# Patient Record
Sex: FEMALE | Race: BLACK OR AFRICAN AMERICAN | Employment: OTHER | URBAN - METROPOLITAN AREA
[De-identification: names, ages, dates, MRNs, and addresses within clinical notes are randomized per-mention and may not be internally consistent; named-entity substitution may affect disease eponyms.]

---

## 2021-01-26 ENCOUNTER — APPOINTMENT (OUTPATIENT)
Dept: GENERAL RADIOLOGY | Age: 81
End: 2021-01-26
Attending: EMERGENCY MEDICINE
Payer: MEDICARE

## 2021-01-26 ENCOUNTER — HOSPITAL ENCOUNTER (EMERGENCY)
Age: 81
Discharge: HOME | End: 2021-01-26
Attending: EMERGENCY MEDICINE
Payer: MEDICARE

## 2021-01-26 VITALS
TEMPERATURE: 98.6 F | HEART RATE: 79 BPM | HEIGHT: 59 IN | RESPIRATION RATE: 14 BRPM | OXYGEN SATURATION: 96 % | DIASTOLIC BLOOD PRESSURE: 89 MMHG | SYSTOLIC BLOOD PRESSURE: 167 MMHG | BODY MASS INDEX: 28.22 KG/M2 | WEIGHT: 140 LBS

## 2021-01-26 DIAGNOSIS — J18.1 RIGHT LOWER LOBE CONSOLIDATION (HCC): ICD-10-CM

## 2021-01-26 DIAGNOSIS — E86.0 MILD DEHYDRATION: ICD-10-CM

## 2021-01-26 DIAGNOSIS — U07.1 COVID-19: Primary | ICD-10-CM

## 2021-01-26 LAB
ALBUMIN SERPL-MCNC: 3.4 G/DL (ref 3.4–5)
ALBUMIN/GLOB SERPL: 0.8 {RATIO} (ref 0.8–1.7)
ALP SERPL-CCNC: 74 U/L (ref 45–117)
ALT SERPL-CCNC: 16 U/L (ref 13–56)
ANION GAP SERPL CALC-SCNC: 7 MMOL/L (ref 3–18)
AST SERPL-CCNC: 23 U/L (ref 10–38)
BASOPHILS # BLD: 0 K/UL (ref 0–0.1)
BASOPHILS NFR BLD: 0 % (ref 0–2)
BILIRUB SERPL-MCNC: 0.3 MG/DL (ref 0.2–1)
BNP SERPL-MCNC: 70 PG/ML (ref 0–1800)
BUN SERPL-MCNC: 17 MG/DL (ref 7–18)
BUN/CREAT SERPL: 26 (ref 12–20)
CALCIUM SERPL-MCNC: 9.1 MG/DL (ref 8.5–10.1)
CHLORIDE SERPL-SCNC: 105 MMOL/L (ref 100–111)
CK MB CFR SERPL CALC: NORMAL % (ref 0–4)
CK MB SERPL-MCNC: <1 NG/ML (ref 5–25)
CK SERPL-CCNC: 85 U/L (ref 26–192)
CO2 SERPL-SCNC: 27 MMOL/L (ref 21–32)
CREAT SERPL-MCNC: 0.65 MG/DL (ref 0.6–1.3)
CRP SERPL-MCNC: 1.6 MG/DL (ref 0–0.3)
DIFFERENTIAL METHOD BLD: ABNORMAL
EOSINOPHIL # BLD: 0 K/UL (ref 0–0.4)
EOSINOPHIL NFR BLD: 1 % (ref 0–5)
ERYTHROCYTE [DISTWIDTH] IN BLOOD BY AUTOMATED COUNT: 13.4 % (ref 11.6–14.5)
GLOBULIN SER CALC-MCNC: 4.1 G/DL (ref 2–4)
GLUCOSE SERPL-MCNC: 91 MG/DL (ref 74–99)
HCT VFR BLD AUTO: 36.7 % (ref 35–45)
HGB BLD-MCNC: 11.8 G/DL (ref 12–16)
LYMPHOCYTES # BLD: 0.9 K/UL (ref 0.9–3.6)
LYMPHOCYTES NFR BLD: 21 % (ref 21–52)
MAGNESIUM SERPL-MCNC: 1.8 MG/DL (ref 1.6–2.6)
MCH RBC QN AUTO: 28.4 PG (ref 24–34)
MCHC RBC AUTO-ENTMCNC: 32.2 G/DL (ref 31–37)
MCV RBC AUTO: 88.2 FL (ref 74–97)
MONOCYTES # BLD: 0.6 K/UL (ref 0.05–1.2)
MONOCYTES NFR BLD: 14 % (ref 3–10)
NEUTS SEG # BLD: 2.7 K/UL (ref 1.8–8)
NEUTS SEG NFR BLD: 64 % (ref 40–73)
PLATELET # BLD AUTO: 176 K/UL (ref 135–420)
PMV BLD AUTO: 11.2 FL (ref 9.2–11.8)
POTASSIUM SERPL-SCNC: 3.9 MMOL/L (ref 3.5–5.5)
PROCALCITONIN SERPL-MCNC: <0.05 NG/ML
PROT SERPL-MCNC: 7.5 G/DL (ref 6.4–8.2)
RBC # BLD AUTO: 4.16 M/UL (ref 4.2–5.3)
SODIUM SERPL-SCNC: 139 MMOL/L (ref 136–145)
TROPONIN I SERPL-MCNC: <0.02 NG/ML (ref 0–0.04)
WBC # BLD AUTO: 4.1 K/UL (ref 4.6–13.2)

## 2021-01-26 PROCEDURE — 74011250636 HC RX REV CODE- 250/636: Performed by: EMERGENCY MEDICINE

## 2021-01-26 PROCEDURE — 80053 COMPREHEN METABOLIC PANEL: CPT

## 2021-01-26 PROCEDURE — 96361 HYDRATE IV INFUSION ADD-ON: CPT

## 2021-01-26 PROCEDURE — 71045 X-RAY EXAM CHEST 1 VIEW: CPT

## 2021-01-26 PROCEDURE — 93005 ELECTROCARDIOGRAM TRACING: CPT

## 2021-01-26 PROCEDURE — 96374 THER/PROPH/DIAG INJ IV PUSH: CPT

## 2021-01-26 PROCEDURE — 99284 EMERGENCY DEPT VISIT MOD MDM: CPT

## 2021-01-26 PROCEDURE — 94640 AIRWAY INHALATION TREATMENT: CPT

## 2021-01-26 PROCEDURE — 74011000250 HC RX REV CODE- 250: Performed by: EMERGENCY MEDICINE

## 2021-01-26 PROCEDURE — 82553 CREATINE MB FRACTION: CPT

## 2021-01-26 PROCEDURE — 86140 C-REACTIVE PROTEIN: CPT

## 2021-01-26 PROCEDURE — 99283 EMERGENCY DEPT VISIT LOW MDM: CPT

## 2021-01-26 PROCEDURE — 74011250637 HC RX REV CODE- 250/637: Performed by: EMERGENCY MEDICINE

## 2021-01-26 PROCEDURE — 83735 ASSAY OF MAGNESIUM: CPT

## 2021-01-26 PROCEDURE — 85025 COMPLETE CBC W/AUTO DIFF WBC: CPT

## 2021-01-26 PROCEDURE — 83880 ASSAY OF NATRIURETIC PEPTIDE: CPT

## 2021-01-26 PROCEDURE — 84145 PROCALCITONIN (PCT): CPT

## 2021-01-26 PROCEDURE — 74011250637 HC RX REV CODE- 250/637

## 2021-01-26 RX ORDER — ONDANSETRON 2 MG/ML
4 INJECTION INTRAMUSCULAR; INTRAVENOUS ONCE
Status: COMPLETED | OUTPATIENT
Start: 2021-01-26 | End: 2021-01-26

## 2021-01-26 RX ORDER — ACETAMINOPHEN 325 MG/1
650 TABLET ORAL
Qty: 30 TAB | Refills: 0 | Status: SHIPPED | OUTPATIENT
Start: 2021-01-26 | End: 2021-02-05

## 2021-01-26 RX ORDER — ACETAMINOPHEN 325 MG/1
TABLET ORAL
Status: DISCONTINUED
Start: 2021-01-26 | End: 2021-01-26

## 2021-01-26 RX ORDER — IPRATROPIUM BROMIDE AND ALBUTEROL SULFATE 2.5; .5 MG/3ML; MG/3ML
6 SOLUTION RESPIRATORY (INHALATION)
Status: COMPLETED | OUTPATIENT
Start: 2021-01-26 | End: 2021-01-26

## 2021-01-26 RX ORDER — ACETAMINOPHEN 325 MG/1
975 TABLET ORAL
Status: COMPLETED | OUTPATIENT
Start: 2021-01-26 | End: 2021-01-26

## 2021-01-26 RX ORDER — AMLODIPINE BESYLATE 10 MG/1
10 TABLET ORAL
Status: COMPLETED | OUTPATIENT
Start: 2021-01-26 | End: 2021-01-26

## 2021-01-26 RX ORDER — DOXYCYCLINE HYCLATE 100 MG
100 TABLET ORAL 2 TIMES DAILY
Qty: 14 TAB | Refills: 0 | Status: SHIPPED | OUTPATIENT
Start: 2021-01-26 | End: 2021-02-02

## 2021-01-26 RX ADMIN — SODIUM CHLORIDE 500 ML: 900 INJECTION, SOLUTION INTRAVENOUS at 13:01

## 2021-01-26 RX ADMIN — ONDANSETRON 4 MG: 2 INJECTION INTRAMUSCULAR; INTRAVENOUS at 13:01

## 2021-01-26 RX ADMIN — AMLODIPINE BESYLATE 10 MG: 10 TABLET ORAL at 13:01

## 2021-01-26 RX ADMIN — ACETAMINOPHEN 975 MG: 325 TABLET, FILM COATED ORAL at 11:10

## 2021-01-26 RX ADMIN — IPRATROPIUM BROMIDE AND ALBUTEROL SULFATE 6 ML: .5; 3 SOLUTION RESPIRATORY (INHALATION) at 13:01

## 2021-01-26 RX ADMIN — ACETAMINOPHEN 975 MG: 325 TABLET ORAL at 11:10

## 2021-01-26 NOTE — DISCHARGE INSTRUCTIONS
You should maintain yourself in isolation for at least 14 days of symptom onset. You should take the doxycycline until it is gone. You can take the Tylenol as needed for fever which is a temperature greater than 100.4. You can take it up to every 6 hours. Use caution not to take any other medications that contain acetaminophen or Tylenol. You are being given a pulse oximeter. You place it on your finger and will tell you the oxygen level in your blood. You should return if your number is less than 92% to be reevaluated. Likewise if you develop chest pain, worsening shortness of breath, uncontrolled vomiting or any other symptoms that concern you, you should return to be rechecked. Zaina Vidal

## 2021-01-26 NOTE — ED PROVIDER NOTES
EMERGENCY DEPARTMENT HISTORY AND PHYSICAL EXAM    11:33 AM    Date: 1/26/2021  Patient Name: Tayo Carreon    History of Presenting Illness     Chief Complaint   Patient presents with    Positive For Covid-19    Shortness of Breath       History Provided By: Patient and EMS  Location/Duration/Severity/Modifying factors   Patient is a pleasant 47-year-old female with past medical history significant for diabetes, peripheral artery disease, chronic pain, hypertension presented to the emergency department today with a chief complaint of fever, nausea, diarrhea, cough and dyspnea on exertion. Symptoms onset originally about 5 days ago with a cough. She reports that 3 days ago she developed a fever. Her son evidently is positive for COVID-19. Patient reports she was seen at [facility] urgent care today where she had a rapid Covid test and that ended up being positive and she was referred to our facility. The cough worsens when she ambulates or takes deep breaths. Nothing seems to really make it better so far. She started having the diarrhea as well which she describes as nonbloody. Shortness of breath also worsens with ambulation improves with rest.  She does not have any chest pain. She denied any abdominal pain. She denies any history of venous thrombosis but reports history of peripheral arterial disease. She evidently did not take her antihypertensives this morning. PCP: No primary care provider on file. Past History     Past Medical History:  Past Medical History:   Diagnosis Date    Arthritis     Diabetes ([address]     Hypertension     Pain management        Past Surgical History:  History reviewed. No pertinent surgical history. Family History:  History reviewed. No pertinent family history.     Social History:  Social History     Tobacco Use    Smoking status: Never Smoker    Smokeless tobacco: Never Used   Substance Use Topics    Alcohol use: Not Currently    Drug use: Not Currently       Allergies: Allergies   Allergen Reactions    Iodinated Contrast Media Rash       I reviewed and confirmed the above information with patient and updated as necessary. Review of Systems     Review of Systems   Constitutional: Positive for fatigue and fever. Negative for chills. HENT: Negative for congestion, rhinorrhea, sinus pressure and sneezing. Eyes: Negative for visual disturbance. Respiratory: Positive for cough, shortness of breath and wheezing. Cardiovascular: Negative for chest pain. Gastrointestinal: Positive for diarrhea, nausea and vomiting. Negative for abdominal pain. Genitourinary: Negative for dysuria, frequency and urgency. Musculoskeletal: Positive for myalgias. Negative for back pain and neck pain. Skin: Negative for rash. Neurological: Negative for syncope, numbness and headaches. Physical Exam     Visit Vitals  BP (!) 167/89 (BP 1 Location: Right arm, BP Patient Position: Sitting)   Pulse 79   Temp (!) 101 °F (38.3 °C)   Resp 14   Ht 4' 11\" (1.499 m)   Wt 63.5 kg (140 lb)   SpO2 96%   BMI 28.28 kg/m²       Physical Exam  Vitals signs and nursing note reviewed. Constitutional:       General: She is not in acute distress. Appearance: Normal appearance. She is well-developed and normal weight. She is not toxic-appearing. HENT:      Head: Normocephalic and atraumatic. Right Ear: External ear normal.      Left Ear: External ear normal.      Nose: Nose normal.      Mouth/Throat:      Mouth: Mucous membranes are moist.   Eyes:      Conjunctiva/sclera: Conjunctivae normal.      Pupils: Pupils are equal, round, and reactive to light. Neck:      Musculoskeletal: Normal range of motion and neck supple. Cardiovascular:      Rate and Rhythm: Normal rate and regular rhythm. Pulses: Normal pulses. Heart sounds: Normal heart sounds. No murmur.    Pulmonary:      Effort: Pulmonary effort is normal. No tachypnea, accessory muscle usage or respiratory distress. Breath sounds: Examination of the right-middle field reveals wheezing. Examination of the left-middle field reveals wheezing. Wheezing present. No rhonchi or rales. Abdominal:      General: Abdomen is flat. Tenderness: There is no abdominal tenderness. There is no guarding or rebound. Musculoskeletal: Normal range of motion. General: No swelling or tenderness. Right lower leg: No edema. Left lower leg: No edema. Skin:     General: Skin is warm and dry. Capillary Refill: Capillary refill takes less than 2 seconds. Findings: No rash. Neurological:      General: No focal deficit present. Mental Status: She is alert and oriented to person, place, and time. Cranial Nerves: No cranial nerve deficit. Motor: No weakness. Diagnostic Study Results     Labs -  Recent Results (from the past 24 hour(s))   CBC WITH AUTOMATED DIFF    Collection Time: 01/26/21 10:52 AM   Result Value Ref Range    WBC 4.1 (L) 4.6 - 13.2 K/uL    RBC 4.16 (L) 4.20 - 5.30 M/uL    HGB 11.8 (L) 12.0 - 16.0 g/dL    HCT 36.7 35.0 - 45.0 %    MCV 88.2 74.0 - 97.0 FL    MCH 28.4 24.0 - 34.0 PG    MCHC 32.2 31.0 - 37.0 g/dL    RDW 13.4 11.6 - 14.5 %    PLATELET 513 740 - 405 K/uL    MPV 11.2 9.2 - 11.8 FL    NEUTROPHILS 64 40 - 73 %    LYMPHOCYTES 21 21 - 52 %    MONOCYTES 14 (H) 3 - 10 %    EOSINOPHILS 1 0 - 5 %    BASOPHILS 0 0 - 2 %    ABS. NEUTROPHILS 2.7 1.8 - 8.0 K/UL    ABS. LYMPHOCYTES 0.9 0.9 - 3.6 K/UL    ABS. MONOCYTES 0.6 0.05 - 1.2 K/UL    ABS. EOSINOPHILS 0.0 0.0 - 0.4 K/UL    ABS.  BASOPHILS 0.0 0.0 - 0.1 K/UL    DF AUTOMATED     METABOLIC PANEL, COMPREHENSIVE    Collection Time: 01/26/21 10:52 AM   Result Value Ref Range    Sodium 139 136 - 145 mmol/L    Potassium 3.9 3.5 - 5.5 mmol/L    Chloride 105 100 - 111 mmol/L    CO2 27 21 - 32 mmol/L    Anion gap 7 3.0 - 18 mmol/L    Glucose 91 74 - 99 mg/dL    BUN 17 7.0 - 18 MG/DL    Creatinine 0.65 0.6 - 1.3 MG/DL    BUN/Creatinine ratio 26 (H) 12 - 20      GFR est AA >60 >60 ml/min/1.73m2    GFR est non-AA >60 >60 ml/min/1.73m2    Calcium 9.1 8.5 - 10.1 MG/DL    Bilirubin, total 0.3 0.2 - 1.0 MG/DL    ALT (SGPT) 16 13 - 56 U/L    AST (SGOT) 23 10 - 38 U/L    Alk. phosphatase 74 45 - 117 U/L    Protein, total 7.5 6.4 - 8.2 g/dL    Albumin 3.4 3.4 - 5.0 g/dL    Globulin 4.1 (H) 2.0 - 4.0 g/dL    A-G Ratio 0.8 0.8 - 1.7     MAGNESIUM    Collection Time: 01/26/21 10:52 AM   Result Value Ref Range    Magnesium 1.8 1.6 - 2.6 mg/dL   NT-PRO BNP    Collection Time: 01/26/21 10:52 AM   Result Value Ref Range    NT pro-BNP 70 0 - 1,800 PG/ML   CARDIAC PANEL,(CK, CKMB & TROPONIN)    Collection Time: 01/26/21 10:52 AM   Result Value Ref Range    CK - MB <1.0 <3.6 ng/ml    CK-MB Index  0.0 - 4.0 %     CALCULATION NOT PERFORMED WHEN RESULT IS BELOW LINEAR LIMIT    CK 85 26 - 192 U/L    Troponin-I, QT <0.02 0.0 - 0.045 NG/ML   EKG, 12 LEAD, INITIAL    Collection Time: 01/26/21 10:59 AM   Result Value Ref Range    Ventricular Rate 85 BPM    Atrial Rate 85 BPM    P-R Interval 168 ms    QRS Duration 76 ms    Q-T Interval 358 ms    QTC Calculation (Bezet) 426 ms    Calculated P Axis 78 degrees    Calculated R Axis 73 degrees    Calculated T Axis 50 degrees    Diagnosis       Sinus rhythm with occasional premature ventricular complexes  Septal infarct , age undetermined  Abnormal ECG  No previous ECGs available           Radiologic Studies -   XR CHEST PORT   Final Result      No definite acute findings. Questionable patchy infiltrate right mid and lower   lung field. Medical Decision Making   I am the first provider for this patient. I reviewed the vital signs, available nursing notes, past medical history, past surgical history, family history and social history. Vital Signs-Reviewed the patient's vital signs. EKG: Interpretation of January 26, 2021, 1059. Sinus rhythm, rate of 85 bpm, normal axis and intervals. Occasional PVCs are present. No ST segment elevation or depression. T wave inversion questionable V3 but artifact obscures this. No pathologic Q waves. Good R wave progression. Overall sinus rhythm, occasional PVCs otherwise no changes. Records Reviewed: Nursing Notes, Old Medical Records, Previous Radiology Studies and Previous Laboratory Studies (Time of Review: 11:33 AM)    ED Course: Progress Notes, Reevaluation, and Consults:  ED Course as of Jan 26 1333   Tue Jan 26, 2021   1255 Contacted the pharmacy, we do have doses of both Regeneron and BAM.  I discussed the risks and benefits potentially with the patient including risk of anaphylaxis or allergic reaction with potential although unproven benefit of improving her duration of illness given her high risk status. Discussed that it may potentially benefit her although the patient may be potential risks of allergic reaction as discussed above. Discussed that it is an experimental treatment with EUA from the FDA but not FDA approval.  Patient indicates that she is not interested in this. She tells me she has zinc at home which she prefers. I Sonia place her on doxycycline given infiltrate on her x-ray. I will give her pulse ox to use at home as well. [MUMTAZ]      ED Course User Index  [MUMTAZ] Thang Borges DO         Provider Notes (Medical Decision Making):   MDM  Number of Diagnoses or Management Options  COVID-19  Mild dehydration  Right lower lobe consolidation [facility])  Diagnosis management comments: Patient is a pleasant 58-year-old female presenting with chief complaint of fever, cough, malaise. Symptoms onset originally 5 days ago with a cough. She developed a fever a couple of days later about 3 days from her presentation. She denies any chest pain but does feel somewhat dyspneic when she ambulates.   She was at urgent care today and tested positive for Covid on the rapid test.  On exam patient is well-appearing ambulates independently to the bathroom. Somewhat dyspneic when she returns. Oxygen saturations maintaining at 96 or 97%. She does have wheezes present somewhat treated with a DuoNeb. Suspect likely sequela from COVID-19 infection given her positive test, will check for electrolyte imbalance, any sign of myocardial ischemia, will check for severity of illness. Lower suspicion for PE given lack of tachycardia or significant hypoxia. Certainly if this changes we will definitely pursue that. Given her high risk status and get a consult the pharmacy and see if they are able to provide any EUA treatments, such as Regeneron or BAM.  Obtain a chest x-ray to look for pneumonia. Case was discussed with pharmacy. See ED course above, the patient ultimately declines these EUA treatments. Results were reviewed. Cardiac biomarkers are negative. Electrolytes are normal.  She is mildly leukopenic which is consistent with known diagnosis of COVID-19. Her x-ray shows maybe some right-sided development opacities I will plan to treat with doxycycline. Discussed with the patient that at times she can get worse during the course of illness and sometimes will get better. Will be seen today is a snapshot of her overall clinical picture. Her oxygen levels are stable on another room with her 97-98 with talking. The patient is not in any respiratory distress. Not hypoxic. She ultimately declines a specialized treatment. She wants to try zinc at home which her son evidently was given when he left the hospital.  She already has some of this at home. I am to give her an ambulatory pulse ox to take home so she can keep an eye on this. I advised her to return if it dips below 92 consistently. Likewise if she feels worse or has any worsening in her symptoms she needs to return at once.     At this time, patient is stable and appropriate for discharge home.  Patient demonstrates understanding of current diagnoses and is in agreement with the treatment plan. Keke Uriarte are advised that while the likelihood of serious underlying condition is low at this point given the evaluation performed today, we cannot fully rule it out. Keke Uriarte are advised to immediately return with any new symptoms or worsening of current condition.  All questions have been answered. Paxton Plummre is given educational material regarding their diagnoses, including danger symptoms and when to return to the ED. This note was dictated utilizing Dragon voice recognition software. Unfortunately this leads to occasional typographical errors. I apologize in advance if the situation occurs. If questions occur please do not hesitate to contact me directly. Kerri Strickland DO            Procedures    Critical Care Time: 0    Diagnosis     Clinical Impression:   1. COVID-19    2. Right lower lobe consolidation ([address]    3. Mild dehydration        Disposition: Discharge    Follow-up Information     Follow up With Specialties Details Why Contact Info    Your Primary Care Physician  In 1 day Call your rheumatologist and PCP tomorrow for follow-up instructions. [facility] EMERGENCY DEPT Emergency Medicine  If symptoms worsen, As needed [address] [place] [address]  [phone]           Patient's Medications   Start Taking    ACETAMINOPHEN (TYLENOL) 325 MG TABLET    Take 2 Tabs by mouth every six (6) hours as needed for Pain or Fever for up to 10 days. DOXYCYCLINE (VIBRA-TABS) 100 MG TABLET    Take 1 Tab by mouth two (2) times a day for 7 days. Continue Taking    No medications on file   These Medications have changed    No medications on file   Stop Taking    No medications on file       Kerri Strickland DO   Emergency Medicine   January 26, 2021, 11:33 AM     This note is dictated utilizing Dragon voice recognition software. Unfortunately this leads to occasional typographical errors using the voice recognition. I apologize in advance if the situation occurs.  If questions occur please do not hesitate to contact me directly.     Flori Done, DO

## 2021-01-26 NOTE — PROGRESS NOTES
Pharmacy Services -  Monoclonal Antibody Treatment evaluation     Completed chart review for Laura North. Patient meets inclusion criteria for use and zero exclusion criteria for bamlanivimab OR casirivimab-imdevimab per FDA EUA. Provider spoke with patient regarding risks and benefits of both monoclonal antibody treatments available. Patient declined treatment at this time. Pharmacy consult will be discontinued. Please contact pharmacy with any questions. Thank you. Bethany Cantu. Kd SAINZ, [address] Clinical Pharmacy Coordinator  [phone]

## 2021-01-27 ENCOUNTER — PATIENT OUTREACH (OUTPATIENT)
Dept: CASE MANAGEMENT | Age: 81
End: 2021-01-27

## 2021-01-27 LAB
ATRIAL RATE: 85 BPM
CALCULATED P AXIS, ECG09: 78 DEGREES
CALCULATED R AXIS, ECG10: 73 DEGREES
CALCULATED T AXIS, ECG11: 50 DEGREES
DIAGNOSIS, 93000: NORMAL
P-R INTERVAL, ECG05: 168 MS
Q-T INTERVAL, ECG07: 358 MS
QRS DURATION, ECG06: 76 MS
QTC CALCULATION (BEZET), ECG08: 426 MS
VENTRICULAR RATE, ECG03: 85 BPM

## 2021-01-27 NOTE — PROGRESS NOTES
Date/Time:  1/27/2021 9:48 AM   Call within 2 business days of discharge: Yes   Attempted to reach patient by telephone. Left HIPPA compliant message requesting a return call. Will attempt to reach patient again.

## 2021-01-28 ENCOUNTER — PATIENT OUTREACH (OUTPATIENT)
Dept: CASE MANAGEMENT | Age: 81
End: 2021-01-28

## 2021-01-28 RX ORDER — GARLIC 1000 MG
1000 CAPSULE ORAL DAILY
COMMUNITY
End: 2021-09-20

## 2021-01-28 RX ORDER — LEFLUNOMIDE 10 MG/1
10 TABLET ORAL DAILY
COMMUNITY
Start: 2020-08-14

## 2021-01-28 RX ORDER — AMLODIPINE BESYLATE 10 MG/1
10 TABLET ORAL DAILY
COMMUNITY
End: 2021-09-07

## 2021-01-28 RX ORDER — ASPIRIN 81 MG/1
81 TABLET ORAL DAILY
COMMUNITY
End: 2021-09-20

## 2021-01-28 RX ORDER — METFORMIN HYDROCHLORIDE 1000 MG/1
500 TABLET, FILM COATED, EXTENDED RELEASE ORAL 2 TIMES DAILY
COMMUNITY
End: 2021-09-20

## 2021-01-28 RX ORDER — ISOSORBIDE MONONITRATE 60 MG/1
60 TABLET, EXTENDED RELEASE ORAL
COMMUNITY
Start: 2020-12-08

## 2021-01-28 RX ORDER — CHLORPHENIRAMINE MALEATE 4 MG
1200 TABLET ORAL DAILY
COMMUNITY
End: 2021-09-20

## 2021-01-28 NOTE — PROGRESS NOTES
Patient contacted regarding COVID-19 diagnosis. Patient does not have pulse OX device - was given a prescription for a pulse OX and stated she would get the device today    Care Transition Nurse/ Ambulatory Care Manager contacted the patient by telephone to perform post discharge assessment. Verified name and  with patient as identifiers. Provided introduction to self, and explanation of the CTN/ACM role, and reason for call due to risk factors for infection and/or exposure to COVID-19. Symptoms reviewed with patient who verbalized the following symptoms: cough, no new symptoms and no worsening symptoms. Due to no new or worsening symptoms encounter was not routed to provider for escalation. Patient has following risk factors of: diabetes. CTN/ACM reviewed discharge instructions, medical action plan and red flags such as increased shortness of breath, increasing fever and signs of decompensation with patient who verbalized understanding. Discussed exposure protocols and quarantine with CDC Guidelines What to do if you are sick with coronavirus disease 2019.  Patient was given an opportunity for questions and concerns. The patient agrees to contact the [facility] exposure line [phone], [facility]  ([phone]) and PCP office for questions related to their healthcare. CTN/ACM provided contact information for future needs. Reviewed and educated patient on any new and changed medications related to discharge diagnosis. Advance Care Planning:   Does patient have an Advance Directive:  not on file; education provided     Medication(s):   Patient/family/caregiver given information for [employer] Loop and agrees to enroll no - Patient does not have e-mail or access to a computer - does not want LOOP    Medication reconciliation done at this encounter with patient /family.  Shows understanding of medication therapy    Patient's preferred e-mail:  [employer]  Patient's preferred phone number:     Based on Loop alert triggers, patient will be contacted by nurse care manager for worsening symptoms. Plan for follow-up call in 3-5 days based on severity of symptoms and risk factors.

## 2021-09-01 ENCOUNTER — TRANSCRIBE ORDER (OUTPATIENT)
Dept: REGISTRATION | Age: 81
End: 2021-09-01

## 2021-09-01 ENCOUNTER — HOSPITAL ENCOUNTER (OUTPATIENT)
Dept: PREADMISSION TESTING | Age: 81
Discharge: HOME | End: 2021-09-01
Payer: MEDICARE

## 2021-09-01 DIAGNOSIS — M16.11 PRIMARY OSTEOARTHRITIS OF RIGHT HIP: Primary | ICD-10-CM

## 2021-09-01 DIAGNOSIS — M16.11 PRIMARY OSTEOARTHRITIS OF RIGHT HIP: ICD-10-CM

## 2021-09-01 LAB
ALBUMIN SERPL-MCNC: 3.7 G/DL (ref 3.4–5)
ALBUMIN/GLOB SERPL: 1 {RATIO} (ref 0.8–1.7)
ALP SERPL-CCNC: 83 U/L (ref 45–117)
ALT SERPL-CCNC: 15 U/L (ref 13–56)
ANION GAP SERPL CALC-SCNC: 5 MMOL/L (ref 3–18)
APPEARANCE UR: CLEAR
APTT PPP: 25.7 SEC (ref 23–36.4)
AST SERPL-CCNC: 17 U/L (ref 10–38)
ATRIAL RATE: 82 BPM
BACTERIA URNS QL MICRO: ABNORMAL /HPF
BASOPHILS # BLD: 0.1 K/UL (ref 0–0.1)
BASOPHILS NFR BLD: 1 % (ref 0–2)
BILIRUB SERPL-MCNC: 0.2 MG/DL (ref 0.2–1)
BILIRUB UR QL: NEGATIVE
BUN SERPL-MCNC: 18 MG/DL (ref 7–18)
BUN/CREAT SERPL: 28 (ref 12–20)
CALCIUM SERPL-MCNC: 10 MG/DL (ref 8.5–10.1)
CALCULATED P AXIS, ECG09: 88 DEGREES
CALCULATED R AXIS, ECG10: 91 DEGREES
CALCULATED T AXIS, ECG11: 94 DEGREES
CHLORIDE SERPL-SCNC: 105 MMOL/L (ref 100–111)
CO2 SERPL-SCNC: 31 MMOL/L (ref 21–32)
COLOR UR: YELLOW
CREAT SERPL-MCNC: 0.64 MG/DL (ref 0.6–1.3)
DIAGNOSIS, 93000: NORMAL
DIFFERENTIAL METHOD BLD: ABNORMAL
EOSINOPHIL # BLD: 0.2 K/UL (ref 0–0.4)
EOSINOPHIL NFR BLD: 3 % (ref 0–5)
EPITH CASTS URNS QL MICRO: ABNORMAL /LPF (ref 0–5)
ERYTHROCYTE [DISTWIDTH] IN BLOOD BY AUTOMATED COUNT: 13.1 % (ref 11.6–14.5)
ERYTHROCYTE [SEDIMENTATION RATE] IN BLOOD: 38 MM/HR (ref 0–30)
GLOBULIN SER CALC-MCNC: 3.7 G/DL (ref 2–4)
GLUCOSE SERPL-MCNC: 86 MG/DL (ref 74–99)
GLUCOSE UR STRIP.AUTO-MCNC: NEGATIVE MG/DL
HBA1C MFR BLD: 6.3 % (ref 4.2–5.6)
HCT VFR BLD AUTO: 35.1 % (ref 35–45)
HGB BLD-MCNC: 11.6 G/DL (ref 12–16)
HGB UR QL STRIP: NEGATIVE
INR PPP: 1 (ref 0.8–1.2)
KETONES UR QL STRIP.AUTO: NEGATIVE MG/DL
LEUKOCYTE ESTERASE UR QL STRIP.AUTO: ABNORMAL
LYMPHOCYTES # BLD: 1.4 K/UL (ref 0.9–3.6)
LYMPHOCYTES NFR BLD: 18 % (ref 21–52)
MCH RBC QN AUTO: 28.9 PG (ref 24–34)
MCHC RBC AUTO-ENTMCNC: 33 G/DL (ref 31–37)
MCV RBC AUTO: 87.5 FL (ref 78–100)
MONOCYTES # BLD: 0.6 K/UL (ref 0.05–1.2)
MONOCYTES NFR BLD: 8 % (ref 3–10)
NEUTS SEG # BLD: 5.4 K/UL (ref 1.8–8)
NEUTS SEG NFR BLD: 71 % (ref 40–73)
NITRITE UR QL STRIP.AUTO: NEGATIVE
P-R INTERVAL, ECG05: 184 MS
PH UR STRIP: 6 [PH] (ref 5–8)
PLATELET # BLD AUTO: 225 K/UL (ref 135–420)
PMV BLD AUTO: 10.2 FL (ref 9.2–11.8)
POTASSIUM SERPL-SCNC: 3.9 MMOL/L (ref 3.5–5.5)
PROT SERPL-MCNC: 7.4 G/DL (ref 6.4–8.2)
PROT UR STRIP-MCNC: NEGATIVE MG/DL
PROTHROMBIN TIME: 12.5 SEC (ref 11.5–15.2)
Q-T INTERVAL, ECG07: 352 MS
QRS DURATION, ECG06: 76 MS
QTC CALCULATION (BEZET), ECG08: 411 MS
RBC # BLD AUTO: 4.01 M/UL (ref 4.2–5.3)
RBC #/AREA URNS HPF: ABNORMAL /HPF (ref 0–5)
SODIUM SERPL-SCNC: 141 MMOL/L (ref 136–145)
SP GR UR REFRACTOMETRY: 1.01 (ref 1–1.03)
UROBILINOGEN UR QL STRIP.AUTO: 0.2 EU/DL (ref 0.2–1)
VENTRICULAR RATE, ECG03: 82 BPM
WBC # BLD AUTO: 7.7 K/UL (ref 4.6–13.2)
WBC URNS QL MICRO: ABNORMAL /HPF (ref 0–5)

## 2021-09-01 PROCEDURE — 36415 COLL VENOUS BLD VENIPUNCTURE: CPT

## 2021-09-01 PROCEDURE — 85025 COMPLETE CBC W/AUTO DIFF WBC: CPT

## 2021-09-01 PROCEDURE — 83036 HEMOGLOBIN GLYCOSYLATED A1C: CPT

## 2021-09-01 PROCEDURE — 85652 RBC SED RATE AUTOMATED: CPT

## 2021-09-01 PROCEDURE — 81001 URINALYSIS AUTO W/SCOPE: CPT

## 2021-09-01 PROCEDURE — 85730 THROMBOPLASTIN TIME PARTIAL: CPT

## 2021-09-01 PROCEDURE — 93005 ELECTROCARDIOGRAM TRACING: CPT

## 2021-09-01 PROCEDURE — 85610 PROTHROMBIN TIME: CPT

## 2021-09-01 PROCEDURE — 80053 COMPREHEN METABOLIC PANEL: CPT

## 2021-09-01 PROCEDURE — 87086 URINE CULTURE/COLONY COUNT: CPT

## 2021-09-02 ENCOUNTER — ANESTHESIA EVENT (OUTPATIENT)
Dept: SURGERY | Age: 81
End: 2021-09-02
Payer: MEDICARE

## 2021-09-03 LAB
BACTERIA SPEC CULT: NORMAL
SERVICE CMNT-IMP: NORMAL
SERVICE CMNT-IMP: NORMAL

## 2021-09-07 ENCOUNTER — HOSPITAL ENCOUNTER (OUTPATIENT)
Dept: PREADMISSION TESTING | Age: 81
Discharge: HOME | End: 2021-09-07

## 2021-09-07 VITALS — WEIGHT: 134 LBS | BODY MASS INDEX: 27.01 KG/M2 | HEIGHT: 59 IN

## 2021-09-07 RX ORDER — CEFAZOLIN SODIUM/WATER 2 G/20 ML
2 SYRINGE (ML) INTRAVENOUS ONCE
Status: CANCELLED | OUTPATIENT
Start: 2021-09-07 | End: 2021-09-07

## 2021-09-07 RX ORDER — FUROSEMIDE 20 MG/1
20 TABLET ORAL DAILY
COMMUNITY
End: 2021-09-20

## 2021-09-07 RX ORDER — MULTIVITAMIN
TABLET ORAL
COMMUNITY
End: 2021-09-20

## 2021-09-07 RX ORDER — BENAZEPRIL HYDROCHLORIDE 40 MG/1
40 TABLET ORAL DAILY
COMMUNITY

## 2021-09-07 RX ORDER — DILTIAZEM HYDROCHLORIDE 120 MG/1
240 CAPSULE, EXTENDED RELEASE ORAL DAILY
COMMUNITY
Start: 2021-06-01

## 2021-09-07 RX ORDER — SODIUM CHLORIDE, SODIUM LACTATE, POTASSIUM CHLORIDE, CALCIUM CHLORIDE 600; 310; 30; 20 MG/100ML; MG/100ML; MG/100ML; MG/100ML
125 INJECTION, SOLUTION INTRAVENOUS CONTINUOUS
Status: CANCELLED | OUTPATIENT
Start: 2021-09-07

## 2021-09-07 RX ORDER — DICLOFENAC SODIUM 10 MG/G
GEL TOPICAL 4 TIMES DAILY
COMMUNITY
End: 2021-09-20

## 2021-09-07 RX ORDER — HYDROCODONE BITARTRATE AND ACETAMINOPHEN 5; 325 MG/1; MG/1
TABLET ORAL
COMMUNITY
End: 2021-09-20

## 2021-09-07 NOTE — PERIOP NOTES
PAT - SURGICAL PRE-ADMISSION INSTRUCTIONS    NAME:  Hill Ruvalcaba                                                          TODAY'S DATE:  9/7/2021    SURGERY DATE:  9/20/2021                                  SURGERY ARRIVAL TIME:   TBA    1. Do NOT eat or drink anything, including candy or gum, after MIDNIGHT on 9-20 , unless you have specific instructions from your Surgeon or Anesthesia Provider to do so. 2. No smoking 24 hours before surgery. 3. No alcohol 24 hours prior to the day of surgery. 4. No recreational drugs for one week prior to the day of surgery. 5. Leave all valuables, including money/purse, at home. 6. Remove all jewelry, nail polish, makeup (including mascara); no lotions, powders, deodorant, or perfume/cologne/after shave. 7. Glasses/Contact lenses and Dentures may be worn to the hospital.  They will be removed prior to surgery. 8. Call your doctor if symptoms of a cold or illness develop within 24 ours prior to surgery. 9. AN ADULT MUST DRIVE YOU HOME AFTER OUTPATIENT SURGERY. 10. If you are having an OUTPATIENT procedure, please make arrangements for a responsible adult to be with you for 24 hours after your surgery. 11. If you are admitted to the hospital, you will be assigned to a bed after surgery is complete. Normally a family member will not be able to see you until you are in your assigned bed. 12. Visitation Restrictions Explained. Special Instructions:  Covid Test 9-16, Quarantine requirements discussed  Bring CPAP. Patient Prep:    use CHG solution     No DNR         These surgical instructions were reviewed with the patient during the PAT phone call      A lot of instructions discussed. Reviewed medication instructions for long period of time.  Patient has [facility] phone number to call back with any questions before [facility]

## 2021-09-15 ENCOUNTER — HOSPITAL ENCOUNTER (OUTPATIENT)
Dept: PREADMISSION TESTING | Age: 81
Discharge: HOME | End: 2021-09-15
Payer: MEDICARE

## 2021-09-15 PROCEDURE — U0003 INFECTIOUS AGENT DETECTION BY NUCLEIC ACID (DNA OR RNA); SEVERE ACUTE RESPIRATORY SYNDROME CORONAVIRUS 2 (SARS-COV-2) (CORONAVIRUS DISEASE [COVID-19]), AMPLIFIED PROBE TECHNIQUE, MAKING USE OF HIGH THROUGHPUT TECHNOLOGIES AS DESCRIBED BY CMS-2020-01-R: HCPCS

## 2021-09-16 PROBLEM — M16.11 PRIMARY LOCALIZED OSTEOARTHRITIS OF RIGHT HIP: Chronic | Status: ACTIVE | Noted: 2021-09-16

## 2021-09-16 RX ORDER — DILTIAZEM HYDROCHLORIDE 120 MG/1
120 CAPSULE, EXTENDED RELEASE ORAL DAILY
Status: CANCELLED | OUTPATIENT
Start: 2021-09-16

## 2021-09-16 RX ORDER — ISOSORBIDE MONONITRATE 60 MG/1
60 TABLET, EXTENDED RELEASE ORAL
Status: CANCELLED | OUTPATIENT
Start: 2021-09-16

## 2021-09-16 NOTE — DISCHARGE INSTRUCTIONS
[address] [place] [address] Sports Medicine   Patient Discharge Instructions    Nikita Padilla / [id number] : 1940    Admitted (Not on file) Discharged: 2021     IF YOU HAVE ANY PROBLEMS ONCE YOU ARE  [facility]:   Main office number: [phone]    Your follow up appointment to see either Dr. Juany Doll PA-C, or [facility] CAROLE as scheduled in 2 weeks. If you are unsure of your appointment date call the office at [phone]. Medication Instructions     · Resume your home medictions as directed, you may have directed not to resume supplements until after your follow up. · A prescription for pain medication has been given   · It is important that you take the medication exactly as they are prescribed. · Keep your medication in the bottles provided by the pharmacist and keep a list of the medication names, dosages, and times to be taken in your wallet. · Do not take other medications without consulting your doctor. What to do at [address] [place] [address] Ave your prehospital diet. If you have excessive nausea or vomitting call your doctor's office. Be sure to maintain adequate fluid intake. Some pain medications may cause constipation. Remember to drink fluids, stay as active as possible, and eat plenty of fiber-rich foods. Begin In-Home Physical Therapy; 3 times a week to work on gait training, range of motion, strengthening, and weight bearing exercises as tolerable. Continue to use your walker or cane when walking. May progress from the walker to a cane to complete total bearing as tolerable. Patient may shower. Wrap incision with plastic wrap/covering to prevent incision from getting wet. Avoid complete immersion. YOUR DRESSING SHOULD BE CHANGED BY YOUR HOME HEALTH NURSE 5-7 AFTER SURGERY ACCORDING TO THE DATE WRITTEN ON YOUR DRESSING.           When to Call    - Call if you have a temperature greater then 101  - Unable to keep food down  - Are unable to bear any wieght   - Need a pain medication refill     Information obtained by :  I understand that if any problems occur once I am at home I am to contact my physician. I understand and acknowledge receipt of the instructions indicated above.                                                                                                                                            Physician's or R.N.'s Signature                                                                  Date/Time                                                                                                                                              Patient or Representative Signature                                                          Date/Time

## 2021-09-16 NOTE — H&P
[address] [facility] [address] Medicine  History and Physical Exam    Patient: Alexandria Montague MRN: [id number]  SSN: [id number]    YOB: 1940  Age: 78 y.o. Sex: female      Subjective:      Chief Complaint: right hip pain    History of Present Illness:  Patient complains of right hip pain and difficulty ambulating, which has progressed over the past several months. X-rays showed osteoarthritis of the joint. The patient's pain has persisted and progressed despite conservative treatments and therapies. The patient has been previously treated with nsaids. The patient has at this time opted for surgical intervention. Past Medical History:   Diagnosis Date    Arthritis     Diabetes ([facility] [address]     Hypertension     Pain management     Primary localized osteoarthritis of right hip 9/16/2021    Rheumatoid arthritis ([facility] [address]     Sleep apnea     uses c-pap     Past Surgical History:   Procedure Laterality Date    HX CHOLECYSTECTOMY      HX COLONOSCOPY      HX KNEE REPLACEMENT Left     NEUROLOGICAL PROCEDURE UNLISTED  2003    back fusion    VASCULAR SURGERY PROCEDURE UNLIST      stents in both lower extremeties     Social History     Occupational History    Not on file   Tobacco Use    Smoking status: Never Smoker    Smokeless tobacco: Never Used   Vaping Use    Vaping Use: Never used   Substance and Sexual Activity    Alcohol use: Not Currently    Drug use: Not Currently    Sexual activity: Not on file     Prior to Admission medications    Medication Sig Start Date End Date Taking? Authorizing Provider   benazepriL (LOTENSIN) 40 mg tablet Take 40 mg by mouth daily. Provider, Historical   multivit-min/iron/folic/lutein (CENTRUM SILVER WOMEN PO) Take  by mouth. Provider, Historical   cinnamon bark 500 mg cap Take  by mouth. Provider, Historical   diclofenac (VOLTAREN) 1 % gel Apply  to affected area four (4) times daily.     Provider, Historical   dilTIAZem ER (TIAZAC) 120 mg capsule Take 120 mg by mouth daily. 6/1/21   Provider, Historical   furosemide (Lasix) 20 mg tablet Take 20 mg by mouth daily. Provider, Historical   HYDROcodone-acetaminophen (NORCO) 5-325 mg per tablet Take  by mouth. Provider, Historical   omega-3 fatty acids-fish oil 360-1,200 mg cap Take 1,200 mg by mouth daily. Provider, Historical   metFORMIN (GLUMETZA) 1,000 mg TG24 24 hour tablet Take 500 mg by mouth two (2) times a day. Provider, Historical   leflunomide (ARAVA) 10 mg tablet Take 10 mg by mouth daily. Indications: rheumatoid arthritis 8/14/20   Provider, Historical   isosorbide mononitrate ER (IMDUR) 60 mg CR tablet Take 60 mg by mouth every morning. 12/8/20   Provider, Historical   garlic 7,832 mg cap Take 1,000 mg by mouth daily. Provider, Historical   aspirin delayed-release 81 mg tablet Take 81 mg by mouth daily. Provider, Historical       Allergies: Allergies   Allergen Reactions    Iodinated Contrast Media Rash        Review of Systems:  A comprehensive review of systems was negative except for that written in the History of Present Illness. Objective:       Physical Exam:  HEENT: Normocephalic, atraumatic  Lungs:  Clear to auscultation  Heart:   Regular rate and rhythm  Abdomen: Soft  Extremities:  Pain with range of motion of the right hip. Passive flexion  degrees,                       passive internal rotation 0-10 degrees, with pain throughout ROM,                        passive external rotation 10-20 degrees with pain at the arc of motion. Antalgic gait noted. Assessment:      Arthritis of the right hip. Plan:       Proceed with scheduled RIGHT TOTAL HIP ARTHROPLASTY. The various methods of treatment have been discussed with the patient and family. After consideration of risks, benefits, and other options for treatment, the patient has consented to surgical interventions.  Questions were answered and preoperative teaching was done by Dr Remy Pulido.      Signed By: NATALY Sullivan     September 16, 2021

## 2021-09-16 NOTE — NURSE NAVIGATOR
Carmen Ramon watched the joint seminar online and received a preoperative education booklet in anticipation of joint replacement surgery.      Orthopaedic Nurse Navigator

## 2021-09-17 LAB — SARS-COV-2, NAA: NOT DETECTED

## 2021-09-20 ENCOUNTER — HOSPITAL ENCOUNTER (OUTPATIENT)
Age: 81
Discharge: HOME HEALTH | End: 2021-09-20
Attending: ORTHOPAEDIC SURGERY | Admitting: ORTHOPAEDIC SURGERY
Payer: MEDICARE

## 2021-09-20 ENCOUNTER — APPOINTMENT (OUTPATIENT)
Dept: GENERAL RADIOLOGY | Age: 81
End: 2021-09-20
Attending: ORTHOPAEDIC SURGERY
Payer: MEDICARE

## 2021-09-20 ENCOUNTER — APPOINTMENT (OUTPATIENT)
Dept: GENERAL RADIOLOGY | Age: 81
End: 2021-09-20
Attending: PHYSICIAN ASSISTANT
Payer: MEDICARE

## 2021-09-20 ENCOUNTER — HOME HEALTH ADMISSION (OUTPATIENT)
Dept: HOME HEALTH SERVICES | Facility: HOME HEALTH | Age: 81
End: 2021-09-20
Payer: MEDICARE

## 2021-09-20 ENCOUNTER — ANESTHESIA (OUTPATIENT)
Dept: SURGERY | Age: 81
End: 2021-09-20
Payer: MEDICARE

## 2021-09-20 VITALS
HEIGHT: 59 IN | HEART RATE: 89 BPM | SYSTOLIC BLOOD PRESSURE: 140 MMHG | BODY MASS INDEX: 26.97 KG/M2 | OXYGEN SATURATION: 100 % | RESPIRATION RATE: 16 BRPM | TEMPERATURE: 97.3 F | WEIGHT: 133.8 LBS | DIASTOLIC BLOOD PRESSURE: 58 MMHG

## 2021-09-20 DIAGNOSIS — M16.11 PRIMARY LOCALIZED OSTEOARTHRITIS OF RIGHT HIP: Primary | ICD-10-CM

## 2021-09-20 LAB
ABO + RH BLD: NORMAL
BLOOD GROUP ANTIBODIES SERPL: NORMAL
GLUCOSE BLD STRIP.AUTO-MCNC: 115 MG/DL (ref 70–110)
GLUCOSE BLD STRIP.AUTO-MCNC: 177 MG/DL (ref 70–110)
SPECIMEN EXP DATE BLD: NORMAL

## 2021-09-20 PROCEDURE — 74011250636 HC RX REV CODE- 250/636: Performed by: ORTHOPAEDIC SURGERY

## 2021-09-20 PROCEDURE — 76942 ECHO GUIDE FOR BIOPSY: CPT | Performed by: ORTHOPAEDIC SURGERY

## 2021-09-20 PROCEDURE — 74011250636 HC RX REV CODE- 250/636: Performed by: PHYSICIAN ASSISTANT

## 2021-09-20 PROCEDURE — 77030040361 HC SLV COMPR DVT MDII -B: Performed by: ORTHOPAEDIC SURGERY

## 2021-09-20 PROCEDURE — 74011250636 HC RX REV CODE- 250/636: Performed by: ANESTHESIOLOGY

## 2021-09-20 PROCEDURE — 82962 GLUCOSE BLOOD TEST: CPT

## 2021-09-20 PROCEDURE — 74011000250 HC RX REV CODE- 250: Performed by: ORTHOPAEDIC SURGERY

## 2021-09-20 PROCEDURE — 76210000006 HC OR PH I REC 0.5 TO 1 HR: Performed by: ORTHOPAEDIC SURGERY

## 2021-09-20 PROCEDURE — 77030037713 HC CLOSR DEV INCIS ZIP STRY -B: Performed by: ORTHOPAEDIC SURGERY

## 2021-09-20 PROCEDURE — 2709999900 HC NON-CHARGEABLE SUPPLY: Performed by: ORTHOPAEDIC SURGERY

## 2021-09-20 PROCEDURE — 77030038010: Performed by: ORTHOPAEDIC SURGERY

## 2021-09-20 PROCEDURE — 76010000149 HC OR TIME 1 TO 1.5 HR: Performed by: ORTHOPAEDIC SURGERY

## 2021-09-20 PROCEDURE — 97116 GAIT TRAINING THERAPY: CPT

## 2021-09-20 PROCEDURE — 76060000033 HC ANESTHESIA 1 TO 1.5 HR: Performed by: ORTHOPAEDIC SURGERY

## 2021-09-20 PROCEDURE — 74011250636 HC RX REV CODE- 250/636: Performed by: NURSE ANESTHETIST, CERTIFIED REGISTERED

## 2021-09-20 PROCEDURE — 74011250637 HC RX REV CODE- 250/637: Performed by: ORTHOPAEDIC SURGERY

## 2021-09-20 PROCEDURE — 36415 COLL VENOUS BLD VENIPUNCTURE: CPT

## 2021-09-20 PROCEDURE — 73501 X-RAY EXAM HIP UNI 1 VIEW: CPT

## 2021-09-20 PROCEDURE — 74011250637 HC RX REV CODE- 250/637: Performed by: PHYSICIAN ASSISTANT

## 2021-09-20 PROCEDURE — 97161 PT EVAL LOW COMPLEX 20 MIN: CPT

## 2021-09-20 PROCEDURE — 77010033678 HC OXYGEN DAILY

## 2021-09-20 PROCEDURE — 77030012893: Performed by: ORTHOPAEDIC SURGERY

## 2021-09-20 PROCEDURE — 77030013708 HC HNDPC SUC IRR PULS STRY –B: Performed by: ORTHOPAEDIC SURGERY

## 2021-09-20 PROCEDURE — 77030020782 HC GWN BAIR PAWS FLX 3M -B: Performed by: ORTHOPAEDIC SURGERY

## 2021-09-20 PROCEDURE — 74011250636 HC RX REV CODE- 250/636: Performed by: INTERNAL MEDICINE

## 2021-09-20 PROCEDURE — 74011636637 HC RX REV CODE- 636/637: Performed by: ANESTHESIOLOGY

## 2021-09-20 PROCEDURE — 77030041461 HC RETRCTR GRIPPR KUSA -C: Performed by: ORTHOPAEDIC SURGERY

## 2021-09-20 PROCEDURE — 97165 OT EVAL LOW COMPLEX 30 MIN: CPT

## 2021-09-20 PROCEDURE — C1776 JOINT DEVICE (IMPLANTABLE): HCPCS | Performed by: ORTHOPAEDIC SURGERY

## 2021-09-20 PROCEDURE — 77030041075 HC DRSG AG OPTIFRM MDII -B: Performed by: ORTHOPAEDIC SURGERY

## 2021-09-20 PROCEDURE — 86901 BLOOD TYPING SEROLOGIC RH(D): CPT

## 2021-09-20 PROCEDURE — 74011250637 HC RX REV CODE- 250/637: Performed by: ANESTHESIOLOGY

## 2021-09-20 PROCEDURE — 77030011264 HC ELECTRD BLD EXT COVD -A: Performed by: ORTHOPAEDIC SURGERY

## 2021-09-20 PROCEDURE — 77030027138 HC INCENT SPIROMETER -A: Performed by: ORTHOPAEDIC SURGERY

## 2021-09-20 PROCEDURE — 64450 NJX AA&/STRD OTHER PN/BRANCH: CPT | Performed by: ANESTHESIOLOGY

## 2021-09-20 PROCEDURE — 77030031139 HC SUT VCRL2 J&J -A: Performed by: ORTHOPAEDIC SURGERY

## 2021-09-20 PROCEDURE — 74011000250 HC RX REV CODE- 250: Performed by: NURSE ANESTHETIST, CERTIFIED REGISTERED

## 2021-09-20 PROCEDURE — 77030003666 HC NDL SPINAL BD -A: Performed by: ORTHOPAEDIC SURGERY

## 2021-09-20 PROCEDURE — 77030016060 HC NDL NRV BLK TELE -A: Performed by: ANESTHESIOLOGY

## 2021-09-20 PROCEDURE — 77030012508 HC MSK AIRWY LMA AMBU -A: Performed by: ANESTHESIOLOGY

## 2021-09-20 PROCEDURE — 97535 SELF CARE MNGMENT TRAINING: CPT

## 2021-09-20 PROCEDURE — 76210000025 HC REC RM PH II 3 TO 3.5 HR

## 2021-09-20 PROCEDURE — 74011000250 HC RX REV CODE- 250: Performed by: ANESTHESIOLOGY

## 2021-09-20 DEVICE — IMPLANTABLE DEVICE
Type: IMPLANTABLE DEVICE | Site: HIP | Status: FUNCTIONAL
Brand: LOGICAL

## 2021-09-20 DEVICE — IMPLANTABLE DEVICE
Type: IMPLANTABLE DEVICE | Site: HIP | Status: FUNCTIONAL
Brand: LOGICAL G-SERIES

## 2021-09-20 DEVICE — IMPLANTABLE DEVICE
Type: IMPLANTABLE DEVICE | Site: HIP | Status: FUNCTIONAL
Brand: SIGNATURE FEMORAL HEAD

## 2021-09-20 DEVICE — IMPLANTABLE DEVICE
Type: IMPLANTABLE DEVICE | Site: HIP | Status: FUNCTIONAL
Brand: SPARTAN HIP STEM

## 2021-09-20 RX ORDER — SODIUM CHLORIDE 9 MG/ML
125 INJECTION, SOLUTION INTRAVENOUS CONTINUOUS
Status: DISCONTINUED | OUTPATIENT
Start: 2021-09-20 | End: 2021-09-20 | Stop reason: HOSPADM

## 2021-09-20 RX ORDER — CEFADROXIL 500 MG/1
500 CAPSULE ORAL 2 TIMES DAILY
Qty: 10 CAPSULE | Refills: 0 | Status: SHIPPED | OUTPATIENT
Start: 2021-09-20 | End: 2021-09-25

## 2021-09-20 RX ORDER — SODIUM CHLORIDE 0.9 % (FLUSH) 0.9 %
5-40 SYRINGE (ML) INJECTION EVERY 8 HOURS
Status: DISCONTINUED | OUTPATIENT
Start: 2021-09-20 | End: 2021-09-20 | Stop reason: HOSPADM

## 2021-09-20 RX ORDER — DEXMEDETOMIDINE HYDROCHLORIDE 100 UG/ML
INJECTION, SOLUTION INTRAVENOUS
Status: SHIPPED | OUTPATIENT
Start: 2021-09-20 | End: 2021-09-20

## 2021-09-20 RX ORDER — MELOXICAM 7.5 MG/1
7.5 TABLET ORAL 2 TIMES DAILY
Qty: 28 TABLET | Refills: 0 | Status: SHIPPED | OUTPATIENT
Start: 2021-09-20 | End: 2021-10-04

## 2021-09-20 RX ORDER — ACETAMINOPHEN 325 MG/1
650 TABLET ORAL EVERY 6 HOURS
Status: DISCONTINUED | OUTPATIENT
Start: 2021-09-20 | End: 2021-09-20 | Stop reason: HOSPADM

## 2021-09-20 RX ORDER — CELECOXIB 100 MG/1
200 CAPSULE ORAL ONCE
Status: COMPLETED | OUTPATIENT
Start: 2021-09-20 | End: 2021-09-20

## 2021-09-20 RX ORDER — LANOLIN ALCOHOL/MO/W.PET/CERES
1 CREAM (GRAM) TOPICAL 3 TIMES DAILY
Status: DISCONTINUED | OUTPATIENT
Start: 2021-09-20 | End: 2021-09-20 | Stop reason: HOSPADM

## 2021-09-20 RX ORDER — FENTANYL CITRATE 50 UG/ML
INJECTION, SOLUTION INTRAMUSCULAR; INTRAVENOUS
Status: SHIPPED | OUTPATIENT
Start: 2021-09-20 | End: 2021-09-20

## 2021-09-20 RX ORDER — LABETALOL HCL 20 MG/4 ML
SYRINGE (ML) INTRAVENOUS AS NEEDED
Status: DISCONTINUED | OUTPATIENT
Start: 2021-09-20 | End: 2021-09-20 | Stop reason: HOSPADM

## 2021-09-20 RX ORDER — SODIUM CHLORIDE 0.9 % (FLUSH) 0.9 %
5-40 SYRINGE (ML) INJECTION AS NEEDED
Status: DISCONTINUED | OUTPATIENT
Start: 2021-09-20 | End: 2021-09-20 | Stop reason: HOSPADM

## 2021-09-20 RX ORDER — OXYCODONE HYDROCHLORIDE 5 MG/1
5 TABLET ORAL
Qty: 60 TABLET | Refills: 0 | Status: SHIPPED | OUTPATIENT
Start: 2021-09-20 | End: 2021-09-27

## 2021-09-20 RX ORDER — SODIUM CHLORIDE, SODIUM LACTATE, POTASSIUM CHLORIDE, CALCIUM CHLORIDE 600; 310; 30; 20 MG/100ML; MG/100ML; MG/100ML; MG/100ML
125 INJECTION, SOLUTION INTRAVENOUS CONTINUOUS
Status: DISCONTINUED | OUTPATIENT
Start: 2021-09-20 | End: 2021-09-20 | Stop reason: HOSPADM

## 2021-09-20 RX ORDER — SODIUM CHLORIDE 9 MG/ML
300 INJECTION, SOLUTION INTRAVENOUS CONTINUOUS
Status: DISCONTINUED | OUTPATIENT
Start: 2021-09-20 | End: 2021-09-20 | Stop reason: HOSPADM

## 2021-09-20 RX ORDER — PANTOPRAZOLE SODIUM 40 MG/1
40 TABLET, DELAYED RELEASE ORAL DAILY
Status: DISCONTINUED | OUTPATIENT
Start: 2021-09-20 | End: 2021-09-20 | Stop reason: HOSPADM

## 2021-09-20 RX ORDER — ROPIVACAINE HYDROCHLORIDE 5 MG/ML
INJECTION, SOLUTION EPIDURAL; INFILTRATION; PERINEURAL
Status: SHIPPED | OUTPATIENT
Start: 2021-09-20 | End: 2021-09-20

## 2021-09-20 RX ORDER — HYDRALAZINE HYDROCHLORIDE 20 MG/ML
10 INJECTION INTRAMUSCULAR; INTRAVENOUS ONCE
Status: COMPLETED | OUTPATIENT
Start: 2021-09-20 | End: 2021-09-20

## 2021-09-20 RX ORDER — FENTANYL CITRATE 50 UG/ML
INJECTION, SOLUTION INTRAMUSCULAR; INTRAVENOUS AS NEEDED
Status: DISCONTINUED | OUTPATIENT
Start: 2021-09-20 | End: 2021-09-20 | Stop reason: HOSPADM

## 2021-09-20 RX ORDER — ASPIRIN 325 MG
325 TABLET, DELAYED RELEASE (ENTERIC COATED) ORAL 2 TIMES DAILY
Status: DISCONTINUED | OUTPATIENT
Start: 2021-09-20 | End: 2021-09-20 | Stop reason: HOSPADM

## 2021-09-20 RX ORDER — HYDROMORPHONE HYDROCHLORIDE 2 MG/ML
INJECTION, SOLUTION INTRAMUSCULAR; INTRAVENOUS; SUBCUTANEOUS AS NEEDED
Status: DISCONTINUED | OUTPATIENT
Start: 2021-09-20 | End: 2021-09-20 | Stop reason: HOSPADM

## 2021-09-20 RX ORDER — DEXAMETHASONE SODIUM PHOSPHATE 4 MG/ML
4 INJECTION, SOLUTION INTRA-ARTICULAR; INTRALESIONAL; INTRAMUSCULAR; INTRAVENOUS; SOFT TISSUE ONCE
Status: COMPLETED | OUTPATIENT
Start: 2021-09-20 | End: 2021-09-20

## 2021-09-20 RX ORDER — MAGNESIUM SULFATE 100 %
4 CRYSTALS MISCELLANEOUS AS NEEDED
Status: DISCONTINUED | OUTPATIENT
Start: 2021-09-20 | End: 2021-09-20 | Stop reason: HOSPADM

## 2021-09-20 RX ORDER — DOCUSATE SODIUM 100 MG/1
100 CAPSULE, LIQUID FILLED ORAL 2 TIMES DAILY
Status: DISCONTINUED | OUTPATIENT
Start: 2021-09-20 | End: 2021-09-20 | Stop reason: HOSPADM

## 2021-09-20 RX ORDER — DEXAMETHASONE SODIUM PHOSPHATE 10 MG/ML
INJECTION INTRAMUSCULAR; INTRAVENOUS
Status: SHIPPED | OUTPATIENT
Start: 2021-09-20 | End: 2021-09-20

## 2021-09-20 RX ORDER — DIPHENHYDRAMINE HYDROCHLORIDE 50 MG/ML
12.5 INJECTION, SOLUTION INTRAMUSCULAR; INTRAVENOUS
Status: DISCONTINUED | OUTPATIENT
Start: 2021-09-20 | End: 2021-09-20 | Stop reason: HOSPADM

## 2021-09-20 RX ORDER — DEXAMETHASONE SODIUM PHOSPHATE 4 MG/ML
8 INJECTION, SOLUTION INTRA-ARTICULAR; INTRALESIONAL; INTRAMUSCULAR; INTRAVENOUS; SOFT TISSUE ONCE
Status: DISCONTINUED | OUTPATIENT
Start: 2021-09-20 | End: 2021-09-20

## 2021-09-20 RX ORDER — INSULIN LISPRO 100 [IU]/ML
INJECTION, SOLUTION INTRAVENOUS; SUBCUTANEOUS ONCE
Status: COMPLETED | OUTPATIENT
Start: 2021-09-20 | End: 2021-09-20

## 2021-09-20 RX ORDER — OXYCODONE HYDROCHLORIDE 5 MG/1
10 TABLET ORAL
Status: DISCONTINUED | OUTPATIENT
Start: 2021-09-20 | End: 2021-09-20 | Stop reason: HOSPADM

## 2021-09-20 RX ORDER — ONDANSETRON 2 MG/ML
4 INJECTION INTRAMUSCULAR; INTRAVENOUS
Status: DISCONTINUED | OUTPATIENT
Start: 2021-09-20 | End: 2021-09-20 | Stop reason: HOSPADM

## 2021-09-20 RX ORDER — CEFAZOLIN SODIUM/WATER 2 G/20 ML
2 SYRINGE (ML) INTRAVENOUS EVERY 8 HOURS
Status: DISCONTINUED | OUTPATIENT
Start: 2021-09-20 | End: 2021-09-20 | Stop reason: HOSPADM

## 2021-09-20 RX ORDER — TRANEXAMIC ACID 10 MG/ML
1 INJECTION, SOLUTION INTRAVENOUS SEE ADMIN INSTRUCTIONS
Status: DISCONTINUED | OUTPATIENT
Start: 2021-09-20 | End: 2021-09-20 | Stop reason: HOSPADM

## 2021-09-20 RX ORDER — METOCLOPRAMIDE HYDROCHLORIDE 5 MG/ML
10 INJECTION INTRAMUSCULAR; INTRAVENOUS
Status: DISCONTINUED | OUTPATIENT
Start: 2021-09-20 | End: 2021-09-20 | Stop reason: HOSPADM

## 2021-09-20 RX ORDER — PANTOPRAZOLE SODIUM 40 MG/1
40 TABLET, DELAYED RELEASE ORAL DAILY
Status: DISCONTINUED | OUTPATIENT
Start: 2021-09-20 | End: 2021-09-20

## 2021-09-20 RX ORDER — NALOXONE HYDROCHLORIDE 0.4 MG/ML
0.4 INJECTION, SOLUTION INTRAMUSCULAR; INTRAVENOUS; SUBCUTANEOUS AS NEEDED
Status: DISCONTINUED | OUTPATIENT
Start: 2021-09-20 | End: 2021-09-20 | Stop reason: HOSPADM

## 2021-09-20 RX ORDER — ZOLPIDEM TARTRATE 5 MG/1
5-10 TABLET ORAL
Status: DISCONTINUED | OUTPATIENT
Start: 2021-09-20 | End: 2021-09-20 | Stop reason: HOSPADM

## 2021-09-20 RX ORDER — HYDROMORPHONE HYDROCHLORIDE 1 MG/ML
0.5 INJECTION, SOLUTION INTRAMUSCULAR; INTRAVENOUS; SUBCUTANEOUS ONCE
Status: COMPLETED | OUTPATIENT
Start: 2021-09-20 | End: 2021-09-20

## 2021-09-20 RX ORDER — CEFAZOLIN SODIUM/WATER 2 G/20 ML
2 SYRINGE (ML) INTRAVENOUS ONCE
Status: COMPLETED | OUTPATIENT
Start: 2021-09-20 | End: 2021-09-20

## 2021-09-20 RX ORDER — DEXTROSE 50 % IN WATER (D50W) INTRAVENOUS SYRINGE
25-50 AS NEEDED
Status: DISCONTINUED | OUTPATIENT
Start: 2021-09-20 | End: 2021-09-20 | Stop reason: HOSPADM

## 2021-09-20 RX ORDER — OXYCODONE HYDROCHLORIDE 5 MG/1
5 TABLET ORAL
Status: DISCONTINUED | OUTPATIENT
Start: 2021-09-20 | End: 2021-09-20 | Stop reason: HOSPADM

## 2021-09-20 RX ORDER — LIDOCAINE HYDROCHLORIDE 20 MG/ML
INJECTION, SOLUTION EPIDURAL; INFILTRATION; INTRACAUDAL; PERINEURAL AS NEEDED
Status: DISCONTINUED | OUTPATIENT
Start: 2021-09-20 | End: 2021-09-20 | Stop reason: HOSPADM

## 2021-09-20 RX ORDER — PROPOFOL 10 MG/ML
INJECTION, EMULSION INTRAVENOUS AS NEEDED
Status: DISCONTINUED | OUTPATIENT
Start: 2021-09-20 | End: 2021-09-20 | Stop reason: HOSPADM

## 2021-09-20 RX ORDER — ASPIRIN 325 MG
325 TABLET ORAL 2 TIMES DAILY
Qty: 42 TABLET | Refills: 0 | Status: SHIPPED | OUTPATIENT
Start: 2021-09-20 | End: 2021-10-11

## 2021-09-20 RX ORDER — ONDANSETRON 2 MG/ML
INJECTION INTRAMUSCULAR; INTRAVENOUS AS NEEDED
Status: DISCONTINUED | OUTPATIENT
Start: 2021-09-20 | End: 2021-09-20 | Stop reason: HOSPADM

## 2021-09-20 RX ORDER — ACETAMINOPHEN 500 MG
1000 TABLET ORAL ONCE
Status: COMPLETED | OUTPATIENT
Start: 2021-09-20 | End: 2021-09-20

## 2021-09-20 RX ORDER — TRANEXAMIC ACID 650 1/1
1950 TABLET ORAL ONCE
Status: DISCONTINUED | OUTPATIENT
Start: 2021-09-20 | End: 2021-09-20

## 2021-09-20 RX ORDER — KETOROLAC TROMETHAMINE 30 MG/ML
15 INJECTION, SOLUTION INTRAMUSCULAR; INTRAVENOUS EVERY 6 HOURS
Status: DISCONTINUED | OUTPATIENT
Start: 2021-09-20 | End: 2021-09-20 | Stop reason: HOSPADM

## 2021-09-20 RX ADMIN — FENTANYL CITRATE 50 MCG: 50 INJECTION, SOLUTION INTRAMUSCULAR; INTRAVENOUS at 07:39

## 2021-09-20 RX ADMIN — DEXMEDETOMIDINE HYDROCHLORIDE 25 MCG: 100 INJECTION, SOLUTION INTRAVENOUS at 07:24

## 2021-09-20 RX ADMIN — MEPIVACAINE HYDROCHLORIDE 10 ML: 20 INJECTION, SOLUTION EPIDURAL; INFILTRATION at 07:24

## 2021-09-20 RX ADMIN — INSULIN LISPRO 2 UNITS: 100 INJECTION, SOLUTION INTRAVENOUS; SUBCUTANEOUS at 09:03

## 2021-09-20 RX ADMIN — CEFAZOLIN 2 G: 1 INJECTION, POWDER, FOR SOLUTION INTRAVENOUS at 07:33

## 2021-09-20 RX ADMIN — SODIUM CHLORIDE, SODIUM LACTATE, POTASSIUM CHLORIDE, AND CALCIUM CHLORIDE 125 ML/HR: 600; 310; 30; 20 INJECTION, SOLUTION INTRAVENOUS at 06:36

## 2021-09-20 RX ADMIN — HYDRALAZINE HYDROCHLORIDE 10 MG: 20 INJECTION INTRAMUSCULAR; INTRAVENOUS at 09:17

## 2021-09-20 RX ADMIN — LIDOCAINE HYDROCHLORIDE 60 MG: 20 INJECTION, SOLUTION INTRAVENOUS at 07:39

## 2021-09-20 RX ADMIN — SODIUM CHLORIDE, SODIUM LACTATE, POTASSIUM CHLORIDE, AND CALCIUM CHLORIDE 125 ML/HR: 600; 310; 30; 20 INJECTION, SOLUTION INTRAVENOUS at 09:24

## 2021-09-20 RX ADMIN — ACETAMINOPHEN 650 MG: 325 TABLET ORAL at 13:57

## 2021-09-20 RX ADMIN — TRANEXAMIC ACID 1 G: 10 INJECTION, SOLUTION INTRAVENOUS at 07:41

## 2021-09-20 RX ADMIN — LABETALOL 20 MG/4 ML (5 MG/ML) INTRAVENOUS SYRINGE 10 MG: at 07:54

## 2021-09-20 RX ADMIN — ACETAMINOPHEN 1000 MG: 500 TABLET ORAL at 06:40

## 2021-09-20 RX ADMIN — CELECOXIB 200 MG: 100 CAPSULE ORAL at 06:40

## 2021-09-20 RX ADMIN — HYDROMORPHONE HYDROCHLORIDE 1 MG: 2 INJECTION, SOLUTION INTRAMUSCULAR; INTRAVENOUS; SUBCUTANEOUS at 07:39

## 2021-09-20 RX ADMIN — FENTANYL CITRATE 50 MCG: 50 INJECTION, SOLUTION INTRAMUSCULAR; INTRAVENOUS at 07:24

## 2021-09-20 RX ADMIN — SODIUM CHLORIDE, SODIUM LACTATE, POTASSIUM CHLORIDE, AND CALCIUM CHLORIDE 1000 ML: 600; 310; 30; 20 INJECTION, SOLUTION INTRAVENOUS at 06:37

## 2021-09-20 RX ADMIN — ROPIVACAINE HYDROCHLORIDE 20 ML: 5 INJECTION, SOLUTION EPIDURAL; INFILTRATION; PERINEURAL at 07:24

## 2021-09-20 RX ADMIN — HYDROMORPHONE HYDROCHLORIDE 0.5 MG: 1 INJECTION, SOLUTION INTRAMUSCULAR; INTRAVENOUS; SUBCUTANEOUS at 09:22

## 2021-09-20 RX ADMIN — ONDANSETRON HYDROCHLORIDE 4 MG: 2 INJECTION INTRAMUSCULAR; INTRAVENOUS at 07:41

## 2021-09-20 RX ADMIN — DEXAMETHASONE SODIUM PHOSPHATE 4 MG: 10 INJECTION, SOLUTION INTRAMUSCULAR; INTRAVENOUS at 07:24

## 2021-09-20 RX ADMIN — PROPOFOL 150 MG: 10 INJECTION, EMULSION INTRAVENOUS at 07:39

## 2021-09-20 RX ADMIN — DEXAMETHASONE SODIUM PHOSPHATE 4 MG: 4 INJECTION, SOLUTION INTRAMUSCULAR; INTRAVENOUS at 06:40

## 2021-09-20 RX ADMIN — PANTOPRAZOLE SODIUM 40 MG: 40 TABLET, DELAYED RELEASE ORAL at 06:40

## 2021-09-20 NOTE — PROGRESS NOTES
Progression of Symptoms:    [] Pain 3/10   [] Improvement post medication administration   [] No improvement, provider notified   [] Nausea   [] Improvement post medication administration   [] No improvement, provider notified   [] Hypotension/Hypertension   [] Improved post medication administration   [] No improvement, provider notified     Readiness for Discharge:  [x] Vital signs stable  [x] + Voiding  [x] Wound intact, drainage minimal  [x] Tolerating PO intake  [x] Cleared by PT (OT if applicable)  [x] Discharge education completed with patient and family member to specifically include   [x] Recommended stool softener  [x] Proper elevation visual demonstration

## 2021-09-20 NOTE — PROGRESS NOTES
Problem: Mobility Impaired (Adult and Pediatric)  Goal: *Acute Goals and Plan of Care (Insert Text)  Description: PT goals to be met in 1 day:  Pt will be able to perform supine<>sit SBA for transfers at home. Pt will be able to perform sit<>stand SBA for increased ability to transfer at home safely. Pt will be able to participate in gt training >100' w/ RW, WBAT, GB and CGA/SBA for improved ability in home upon d/c. Pt will be educated regarding HEP per MD protocol for optimal AROM/strength outcomes. Note: [x]  Patient has met MD mobilization critieria for d/c home   [x]  Recommend HH with 24 hour adult care   []  Benefit from additional acute PT session to address:      PHYSICAL THERAPY EVALUATION    Patient: Brittany Lynn (24 y.o. female)  Date: 9/20/2021  Primary Diagnosis: Primary localized osteoarthritis of right hip [M16.11]  Procedure(s) (LRB):  RIGHT TOTAL HIP REPLACEMENT ANTERIOR APPROACH WITH C-ARM (Right) Day of Surgery   Precautions:   Fall, WBAT    PLOF: Independent    ASSESSMENT :  Based on the objective data described below, the patient presents with decreased mobility in regards to bed mobility, transfers, gt quality and tolerance, balance, stair negotiation and safety due to R BOGDAN surgery. Decreased AROM of R hip, dec strength of R hip, dec sensation of R hip also impacting pt functional mobility. Pt rating pain on numerical pain scale pre/post and during session 0/10. Pt ed regarding mobility safety, WB, HEP, ice application/use, elevation, environmental safety and home safe techniques. Pt sitting in recliner upon arrial.  Pt able to perform sit<>stand w/ CGA. Safety vc required throughout session to reinforce safety. Pt able to participate in gt training using RW, GB, WBAT and CGA w/ antalgic gt pattern. Pt required significant additional time for gait training. Pt has threshold step into home and discussed strategy for home entry w/ pt understanding and confidence.   Answered questions by pt in regards to PT and mobility. Pt left sitting in recliner w/ all needs within reach and ice pack to R hip. Nurse Jama aware of session and outcomes. Recommend HHPT with responsible adult care at least 24 hours upon hospital d/c. Patient will benefit from skilled intervention to address the above impairments. Patient's rehabilitation potential is considered to be Good  Factors which may influence rehabilitation potential include:   []         None noted  []         Mental ability/status  []         Medical condition  []         Home/family situation and support systems  []         Safety awareness  [x]         Pain tolerance/management  []         Other:      PLAN :  Recommendations and Planned Interventions:   [x]           Bed Mobility Training             []    Neuromuscular Re-Education  [x]           Transfer Training                   []    Orthotic/Prosthetic Training  [x]           Gait Training                          [x]    Modalities  [x]           Therapeutic Exercises           [x]    Edema Management/Control  [x]           Therapeutic Activities            [x]    Family Training/Education  [x]           Patient Education  []           Other (comment):    Frequency/Duration: Patient will be followed by physical therapy 1-2 times per day/4-7 days per week to address goals. Discharge Recommendations: Home Health  Further Equipment Recommendations for Discharge: N/A     SUBJECTIVE:   Patient stated I think I was sleeping.     OBJECTIVE DATA SUMMARY:     Past Medical History:   Diagnosis Date    Arthritis     Diabetes ([facility] [address]     Hypertension     Pain management     Primary localized osteoarthritis of right hip 9/16/2021    Rheumatoid arthritis ([facility] [address]     Sleep apnea     uses c-pap     Past Surgical History:   Procedure Laterality Date    HX CHOLECYSTECTOMY      HX COLONOSCOPY      HX KNEE REPLACEMENT Left     NEUROLOGICAL PROCEDURE UNLISTED  2003    back fusion    VASCULAR SURGERY PROCEDURE UNLIST      stents in both lower extremeties     Barriers to Learning/Limitations: yes;  anesthesia  Compensate with: Visual Cues, Verbal Cues, and Tactile Cues  Home Situation:  Home Situation  Home Environment: Private residence  # Steps to Enter: 1 (threshold step)  One/Two Story Residence: Two story, live on 1st floor  Lift Chair Available: No  Living Alone: Yes  Support Systems: Other Family Member(s), Child(jennifer), Friend/Neighbor  Patient Expects to be Discharged to[de-identified] House  Current DME Used/Available at Home: [employer], straight, Walker, rolling, Arlean Schilder, rollator, Shower chair  Tub or Shower Type: Shower  Critical Behavior:  Neurologic State: Alert  Orientation Level: Oriented to person;Oriented to place;Oriented to situation  Cognition: Follows commands  Safety/Judgement: Awareness of environment  Psychosocial  Patient Behaviors: Calm; Cooperative  Purposeful Interaction: Yes  Pt Identified Daily Priority: Clinical issues (comment)  Caritas Process: Nurture loving kindness  Caring Interventions: Reassure; Therapeutic modalities  Reassure: Therapeutic listening; Informing  Therapeutic Modalities: Deep breathing  Skin Integrity: Incision (comment) (R hip)  Skin Integumentary  Skin Integrity: Incision (comment) (R hip)  Strength:    Strength: Generally decreased, functional  Tone & Sensation:   Tone: Normal  Sensation: Impaired (R hip)  Range Of Motion:  AROM: Generally decreased, functional  Posture:  Functional Mobility:  Bed Mobility:  Scooting: Stand-by assistance (vc)  Transfers:  Sit to Stand: Contact guard assistance (vc)  Stand to Sit: Contact guard assistance (vc)  Balance:   Sitting: Intact  Standing: Intact; With support  Ambulation/Gait Training:  Distance (ft): 80 Feet (ft)  Assistive Device: Walker, rolling;Gait belt  Ambulation - Level of Assistance: Contact guard assistance; Additional time (vc)  Gait Abnormalities: Antalgic;Decreased step clearance; Step to gait  Right Side Weight Bearing: As tolerated  Base of Support: Shift to left  Stance: Right decreased  Speed/Hilda: Slow  Step Length: Left shortened;Right shortened  Swing Pattern: Left asymmetrical;Right asymmetrical  Interventions: Safety awareness training; Tactile cues; Verbal cues; Visual/Demos  Stairs: Therapeutic Exercises:   Encouraged HEP  Pain:  Pain level pre-treatment: 0/10   Pain level post-treatment: 0/10   Pain Intervention(s) : Medication (see MAR); Rest, Ice, Repositioning  Response to intervention: Nurse notified, See doc flow    Activity Tolerance:   Fair   Please refer to the flowsheet for vital signs taken during this treatment. After treatment:   [x]         Patient left in no apparent distress sitting up in chair  []         Patient left in no apparent distress in bed  [x]         Call bell left within reach  [x]         Nursing notified  []         Caregiver present  []         Bed alarm activated  []         SCDs applied    COMMUNICATION/EDUCATION:   [x]         Role of Physical Therapy in the acute care setting. [x]         Fall prevention education was provided and the patient/caregiver indicated understanding. [x]         Patient/family have participated as able in goal setting and plan of care. [x]         Patient/family agree to work toward stated goals and plan of care. []         Patient understands intent and goals of therapy, but is neutral about his/her participation. []         Patient is unable to participate in goal setting/plan of care: ongoing with therapy staff.  []         Other:     Thank you for this referral.  Austin Aceves, PT   Time Calculation: 29 mins      Eval Complexity: History: HIGH Complexity :3+ comorbidities / personal factors will impact the outcome/ POC Exam:MEDIUM Complexity : 3 Standardized tests and measures addressing body structure, function, activity limitation and / or participation in recreation  Presentation: LOW Complexity : Stable, uncomplicated  Clinical Decision Making:Low Complexity    Overall Complexity:LOW

## 2021-09-20 NOTE — PERIOP NOTES
Pt. Used restroom in pre-op area with assistance. Patient placed on Azucena Paws for a minimum of 30 min in  Preop.

## 2021-09-20 NOTE — OP NOTES
[address] [address]  Total Hip Arthroplasty      Patient: Floresita Gonzalez MRN: [id number]  SSN: [id number]    YOB: 1940  Age: 78 y.o. Sex: female      Date of Surgery: 9/20/2021   Preoperative Diagnosis: RIGHT HIP OSTEOARTHRITIS   Postoperative Diagnosis: RIGHT HIP OSTEOARTHRITIS   Location: [facility]  Surgeon: Vannessa Lechuga MD  [facility]    Anesthesia: general    Procedure: Total Right Hip Arthroplasty    Findings: Degenerative joint disease of the right hip. Estimated Blood Loss: 250ml    Specimens: None    Complications: none    Implants:   Implant Name Type Inv. Item Serial No.  Lot No. LRB No. Used Action   LOGICAL G SERIES ACETABULAR SHELL    [employer] [id number] Right 1 Implanted   LINER ACET 52-54 MM NEUT XL 36 MM HIP UHMWPE FELIX - [id number]  LINER ACET 52-54 MM NEUT XL 36 MM HIP UHMWPE FELIX  [employer] [id number] Right 1 Implanted   SPARTAN HIP STEM    SIGN[employer] [id number] Right 1 Implanted   CERAMIC FEMORAL HEAD    [employer] [id number] Right 1 Implanted       Procedure Detail:  After the patient was brought to the operating suite, She was effectively anesthetized using general anesthesia, then transferred to the Bozman table and secured in a standard fashion. Her right hip was then prepped and draped in a normal sterile orthopedic fashion. She was given appropriate intravenous antibiotics preoperatively. After a proper timeout was performed, a direct anterior approach to the hip was performed using a short Santos-Cunningham interval. Anterior capsulotomy was performed. The degenerative changes of the hip were noted. Femoral neck osteotomy was then performed to the templated area. The head and neck were removed. The pulvinar and labrum were excised. The acetabulum was then reamed up to 50 mm with good bleeding cancellous bone obtained.  The cup was then irrigated with pulse lavage system. A 50 mm Signature Logical G cup was then impacted in place with excellent stable fixation obtained, placing the cup at about 45 degrees of abduction, 20 degrees of anteversion. The liner was then impacted in place. A screw was not placed. Attention was turned to the femur, which was delivered into the wound with a combination of extension, external rotation, and adduction, and using the hook on the [place] table to deliver the femur into the wound. The canal was broached up to a size 3 for the Spartan stem system with excellent stable fixation obtained. A trial reduction was then performed with the standard neck offset and 36 mm head balls with various neck lengths. With the +0, she appeared to have equalization of leg lengths and restoration of offset radiographically using the c-arm and joint point navigation system, and excellent functional stability was noted. The trial broach was removed. The canal was irrigated with the pulse lavage system. The final components were impacted in place with excellent stable fixation obtained once again. The final reduction was performed and once again leg lengths and offset were restored radiographically, using the C-arm radiographically intraoperatively, and excellent functional stability was noted. The wound was then irrigated one more time, and then closed in layers. The fascia of the tensor was closed with #1 Vicryl in a running type stitch. Subcutaneous tissue was closed with 2-0 Vicryl in a simple buried stitch, and the skin was closed with Prineo. Dry, sterile dressing was then applied. She tolerated this well, was transferred to the bed, and taken to recovery room, extubated, in stable condition. All sponge and needle counts were correct.     Signed By: Flaco Hernandez MD     September 20, 2021

## 2021-09-20 NOTE — DISCHARGE SUMMARY
[address] [address]     DISCHARGE SUMMARY     PATIENT: Robert Steel     MRN: [id number]   ADMIT DATE: 2021   [facility]   DISCHARGE DATE: 2021     ATTENDING: Bobby Kwong MD   DICTATING: NATALY Pool     ADMISSION DIAGNOSIS: Primary localized osteoarthritis of right hip [M16.11]    DISCHARGE DIAGNOSIS: Status post RIGHT TOTAL HIP ARTHROPLASTY    HISTORY OF PRESENT ILLNESS: The patient is a 78y.o. year-old female   with ongoing right hip pain secondary to osteoarthritis of right hip. The patient's pain has persisted and progressed despite conservative treatments and therapies. The patient has at this time opted for surgical intervention. PAST MEDICAL HISTORY:   Past Medical History:   Diagnosis Date    Arthritis     Diabetes ([address]     Hypertension     Pain management     Primary localized osteoarthritis of right hip 2021    Rheumatoid arthritis ([facility] [address]     Sleep apnea     uses c-pap       PAST SURGICAL HISTORY:   Past Surgical History:   Procedure Laterality Date    HX CHOLECYSTECTOMY      HX COLONOSCOPY      HX KNEE REPLACEMENT Left     NEUROLOGICAL PROCEDURE UNLISTED      back fusion    VASCULAR SURGERY PROCEDURE UNLIST      stents in both lower extremeties       ALLERGIES:   Allergies   Allergen Reactions    Iodinated Contrast Media Rash     Able to use topical betadine w/out any issues that she's aware of. CURRENT MEDICATIONS:  A list of medications prior to the time of admission include:  Prior to Admission medications    Medication Sig Start Date End Date Taking? Authorizing Provider   aspirin (ASPIRIN) 325 mg tablet Take 1 Tablet by mouth two (2) times a day for 21 days. 9/20/21 10/11/21 Yes Alejandro Hussein PA   cefadroxil (DURICEF) 500 mg capsule Take 1 Capsule by mouth two (2) times a day for 5 days.  21 Yes Alejandro Hussein PA   meloxicam (MOBIC) 7.5 mg tablet Take 1 Tablet by mouth two (2) times a day for 14 days. 9/20/21 10/4/21 Yes Alejandro Hussein PA   oxyCODONE IR (ROXICODONE) 5 mg immediate release tablet Take 1 Tablet by mouth every four (4) hours as needed for Pain for up to 7 days. Max Daily Amount: 30 mg. 9/20/21 9/27/21 Yes Alejandro Hussein PA   benazepriL (LOTENSIN) 40 mg tablet Take 40 mg by mouth daily. Yes Provider, Historical   dilTIAZem ER (TIAZAC) 120 mg capsule Take 120 mg by mouth daily. 6/1/21  Yes Provider, Historical   leflunomide (ARAVA) 10 mg tablet Take 10 mg by mouth daily. Indications: rheumatoid arthritis 8/14/20  Yes Provider, Historical   isosorbide mononitrate ER (IMDUR) 60 mg CR tablet Take 60 mg by mouth every morning. 12/8/20  Yes Provider, Historical       FAMILY HISTORY: History reviewed. No pertinent family history. SOCIAL HISTORY:   Social History     Socioeconomic History    Marital status:      Spouse name: Not on file    Number of children: Not on file    Years of education: Not on file    Highest education level: Not on file   Tobacco Use    Smoking status: Never Smoker    Smokeless tobacco: Never Used   Vaping Use    Vaping Use: Never used   Substance and Sexual Activity    Alcohol use: Not Currently    Drug use: Not Currently     Social Determinants of Health     Financial Resource Strain:     Difficulty of Paying Living Expenses:    Food Insecurity:     Worried About Running Out of Food in the Last Year:     Ran Out of Food in the Last Year:    Transportation Needs:     Lack of Transportation (Medical):      Lack of Transportation (Non-Medical):    Physical Activity:     Days of Exercise per Week:     Minutes of Exercise per Session:    Stress:     Feeling of Stress :    Social Connections:     Frequency of Communication with Friends and Family:     Frequency of Social Gatherings with Friends and Family:     Attends Taoism Services:     Active Member of Clubs or Organizations:     Attends [employer] or Organization Meetings:     Marital Status:        REVIEW OF SYSTEMS: All review of systems are negative. PHYSICAL EXAMINATION: For a detailed physical exam, please refer to the patient's chart. HOSPITAL COURSE: The patient was taken to surgery the day of admission. she underwent right total hip replacement via the anterior approach. Operative course was benign. Estimated blood loss approximately 300 cc. The patient was taken to the PACU in stable condition and was later taken to the floor in stable condition. Post-op Day of surgery, patient has done very well.  she has had little to no pain. she had been cleared by physical therapy with stair training. she was placed on Aspirin for DVT prophylaxis. her vitals have remained stable. she has also remained hemodynamically stable. The patient has been recommended for discharge home. DISCHARGE INSTRUCTIONS: The patient is to be discharged home. she is to continue on her prior medications per the medication reconciliation form, to which we will add:         1)  Aspirin 325 mg; 1 tablet p.o. b.i.d. X 21 days         2)  Mobic 7.5 mg; 1 tablet p.o. BID x 14 days           3)  Roxicodone 5 mg; 1 tablets p.o. every 4 hours p.r.n. for pain         4)  Duricef 500 mg; 1 tablet p.o. every 12 hours x 5 days    The patient is to continue at home with home physical therapy 3 times a week to work on gait training, range of motion, strengthening, and weightbearing exercises as tolerated on her right lower extremity. The patient is to progress from a walker to a cane to complete total weightbearing as tolerable. The patient is to continue to keep her incision dry. The patient is to followup with Dr. Ligia Kim, Governor Chim[facility] PABetsyC, and/or [facility] PASAHRI in the office approximately 10-14 days status post for x-rays and further evaluation.       Mana Mcclure [address] [place] [address]  6/03/39374:71 PM

## 2021-09-20 NOTE — INTERVAL H&P NOTE
Update History & Physical    The Patient's History and Physical of September 16, 2021 was reviewed with the patient and I examined the patient. There was no change. The surgical site was confirmed by the patient and me. Plan:  The risk, benefits, expected outcome, and alternative to the recommended procedure have been discussed with the patient. Patient understands and wants to proceed with the procedure.     Electronically signed by Marko Jones MD on 9/20/2021 at 7:03 AM

## 2021-09-20 NOTE — NURSE NAVIGATOR
Pietro Rondon rounded on post anterior hip replacement. Discussed the following during rounding: Activity:  OOB for all meals. Promoting Circulation  Ankle pumps 10 times an hour at hospital & home. Make sure to walk short distances every hour while awake to help with muscle tightness, aching and soreness. Follow the exercises and mobility instructions provided by Physical Therapy. Change positions every hour to help ease stiffness and soreness. Pain Control:  Pain medications side effects discussed. Use ice, distraction, moving, & change position to help with pain. Rest between activity. Reminded narcotics and anesthesia cause constipation so need to take stool softener/mild laxative daily while on narcotics. Reviewed how to safely elevate the leg after exercises for 30 minutes and before bed to decrease swelling. Incentive Spirometry:    Use of incentive spirometer 10 x/hr  Diet:   Eat for healing. Drink enough fluids so urine is lemonade in color. .   Reminded medication can cause nausea if taken on an empty stomach so need to eat before taking them. Patient Safety:   Call light & belongings in reach. Call for help when want to walk or get OOB. Pietro Rondon and son verbalized understand. Given the opportunity for asking questions.    Orthopedic Navigator

## 2021-09-20 NOTE — ANESTHESIA POSTPROCEDURE EVALUATION
Post-Anesthesia Evaluation and Assessment    Cardiovascular Function/Vital Signs  Visit Vitals  BP (!) 147/51   Pulse 68   Temp 37.1 °C (98.8 °F)   Resp 14   Ht 4' 11\" (1.499 m)   Wt 60.7 kg (133 lb 12.8 oz)   SpO2 100%   BMI 27.02 kg/m²       Patient is status post Procedure(s):  RIGHT TOTAL HIP REPLACEMENT ANTERIOR APPROACH WITH C-ARM. Nausea/Vomiting: Controlled. Postoperative hydration reviewed and adequate. Pain:  Pain Scale 1: FLACC (09/20/21 0930)  Pain Intensity 1: 1 (09/20/21 0930)   Managed. Neurological Status:   Neuro (WDL): Exceptions to WDL (09/20/21 0927)   At baseline. Mental Status and Level of Consciousness: Arousable. Pulmonary Status:   O2 Device: Nasal cannula (09/20/21 0919)   Adequate oxygenation and airway patent. Complications related to anesthesia: None    Post-anesthesia assessment completed. No concerns. Patient has met all discharge requirements. Signed By: Americo Jamil MD    September 20, 2021               Procedure(s):  RIGHT TOTAL HIP REPLACEMENT ANTERIOR APPROACH WITH C-ARM. spinal    <BSHSIANPOST>    INITIAL Post-op Vital signs:   Vitals Value Taken Time   /51 09/20/21 0929   Temp 37.1 °C (98.8 °F) 09/20/21 0927   Pulse 68 09/20/21 0938   Resp 12 09/20/21 0938   SpO2 100 % 09/20/21 0938   Vitals shown include unvalidated device data.

## 2021-09-20 NOTE — PERIOP NOTES
TRANSFER - OUT REPORT:    Verbal report given to Leelarissa Jimquentin Stanley on [place] [address]  being transferred 228 (unit) for routine progression of care       Report consisted of patients Situation, Background, Assessment and   Recommendations(SBAR). Information from the following report(s) SBAR, OR Summary, Procedure Summary, Intake/Output and Cardiac Rhythm SR was reviewed with the receiving nurse. Lines:   Peripheral IV 37/12/46 Right Cephalic (Active)   Site Assessment Clean, dry, & intact 09/20/21 0850   Phlebitis Assessment 0 09/20/21 0850   Infiltration Assessment 0 09/20/21 0850   Dressing Status Clean, dry, & intact 09/20/21 0850   Dressing Type Tape;Transparent 09/20/21 0850   Hub Color/Line Status Green; Infusing;Patent 09/20/21 0850        Opportunity for questions and clarification was provided.       Patient transported with:   O2 @ 2 liters  Registered Nurse

## 2021-09-20 NOTE — PROGRESS NOTES
[facility] Same Day Discharge     [phone]- Patient arrives to unit at this time. Dual skin assessment completed with Elmira BEYER RN no abnormalities noted other than surgical incision to right hip. Patient is A/O x 4, able to transfer to recliner. Lungs clear, radial pulses present , pedal pulses present , abdomen soft and non-distended. Bowel sounds active, 18 G IV to RFA,  intact and patent infusing. No signs of phlebitis or infiltration noted. TEDS bilaterally. Skin warm and dry  with mepilex dressing to right hip CDI. Patient oriented to room to include use of call bell, meal ordering, and use of incentive spirometer, patient able to get IS to 750. Patient instructed to order lunch and given explanation of home for dinner plan. Phone and call bell left within reach. Educated on pain medication availability and possible side effects, as well as need to call for assistance before getting out of bed. Patient verbalized understanding.       Symptoms present on arrival:  [] Pain 4/10   [] Medicated  [] Nausea   [] Medicated   [] Hypotension/Hypertension   [] Provider notified

## 2021-09-20 NOTE — ANESTHESIA PROCEDURE NOTES
Peripheral Block    Start time: 9/20/2021 7:16 AM  End time: 9/20/2021 7:25 AM  Performed by: Rocio Grant MD  Authorized by: Rocio Grant MD       Pre-procedure:    Indications: at surgeon's request and post-op pain management    Preanesthetic Checklist: patient identified, risks and benefits discussed, site marked, timeout performed, anesthesia consent given and patient being monitored      Block Type:   Block Type:  Pericapsular Nerve Group (PENG)  Laterality:  Right  Monitoring:  Standard ASA monitoring, continuous pulse ox, frequent vital sign checks, heart rate, responsive to questions and oxygen  Injection Technique:  Single shot  Procedures: ultrasound guided    Location:  Upper thigh  Needle Type:  Pajunk  Needle Gauge:  21 G  Needle Localization:  Ultrasound guidance  Medication Injected:  FentaNYL citrate (PF) injection sedation initial, 50 mcg  mepivacaine (PF) 2 % (POLOCAINE) injection, 10 mL  ropivacaine (PF) (NAROPIN) 5 mg/mL (0.5 %) injection, 20 mL  dexamethasone (PF) (DECADRON) 10 mg/mL injection, 4 mg  dexmedeTOMidine (PRECEDEX) 100 mcg/mL iv solution, 25 mcg  Med Admin Time: 9/20/2021 7:24 AM    Assessment:  Number of attempts:  1  Injection Assessment:  Incremental injection every 5 mL, no paresthesia, ultrasound image on chart, local visualized surrounding nerve on ultrasound, negative aspiration for blood and no intravascular symptoms  Patient tolerance:  Patient tolerated the procedure well with no immediate complications

## 2021-09-20 NOTE — ANESTHESIA PREPROCEDURE EVALUATION
Relevant Problems   No relevant active problems       Anesthetic History   No history of anesthetic complications            Review of Systems / Medical History  Patient summary reviewed, nursing notes reviewed and pertinent labs reviewed    Pulmonary        Sleep apnea: CPAP           Neuro/Psych   Within defined limits           Cardiovascular    Hypertension              Exercise tolerance: >4 METS     GI/Hepatic/Renal                Endo/Other    Diabetes    Arthritis     Other Findings              Physical Exam    Airway  Mallampati: II  TM Distance: 4 - 6 cm  Neck ROM: normal range of motion        Cardiovascular               Dental    Dentition: Full upper dentures     Pulmonary                 Abdominal  GI exam deferred       Other Findings            Anesthetic Plan    ASA: 2  Anesthesia type: spinal      Post-op pain plan if not by surgeon: peripheral nerve block single    Induction: Intravenous  Anesthetic plan and risks discussed with: Patient

## 2021-09-20 NOTE — PROGRESS NOTES
Problem: Self Care Deficits Care Plan (Adult)  Goal: *Acute Goals and Plan of Care (Insert Text)  Description: Initial Occupational Therapy Goals (9/20/2021) Within 7 day(s):    1. Patient will perform grooming standing sinkside with supervision for increased independence with ADLs. 2. Patient will perform LB dressing with supervision & A/E PRN for increased independence with ADLs. 3. Patient will perform toilet transfer with supervision for increased independence with ADLs. 4. Patient will perform all aspects of toileting with supervision for increased independence with ADLs. 5. Patient will independently apply energy conservation techniques with 1 verbal cue(s)for increased independence with ADLs. 6. Patient will perform bathroom mobility with supervision for increased independence/safety with ADLs. Outcome: Progressing Towards Goal   OCCUPATIONAL THERAPY EVALUATION    Patient: Derek Saleem (20 y.o. female)  Date: 9/20/2021  Primary Diagnosis: Primary localized osteoarthritis of right hip [M16.11]  Procedure(s) (LRB):  RIGHT TOTAL HIP REPLACEMENT ANTERIOR APPROACH WITH C-ARM (Right) Day of Surgery   Precautions: Fall, WBAT  PLOF: pt mod I for ADLs/functional mobility    ASSESSMENT AND RECOMMENDATIONS:  Based on the objective data described below, the patient presents with RLE decreased ROM and strength affecting LE ADLs. Pt found seated in recliner chair, vitals assessed and WNL, pt reporting pain 0/10. Pt completed upper body dressing with supervision seated in chair. Pt able to thread B feet through underwear/skirts without assist, and CGA when standing to pull up to waist. Pt CGA for STS/bathroom mobility/toilet transfer with vc for proper body mechanics. Pt voided and performed bladder hygiene with SBA. Pt ambulated back to recliner, ice applied to R hip. Provided opportunity for pt to voice questions on ADL performance when home, pt has no further concerns.  Patient will benefit from skilled Occupational Therapy intervention to maximize safety/independence with ADLs at d/c.    Education: Reviewed home safety, body mechanics, importance of moving every hour to prevent joint stiffness, role of ice for edema/pain control, Rolling Walker management/safety, and adaptive dressing techniques with patient verbalizing  understanding at this time     Patient will benefit from skilled intervention to address the above impairments. Patient's rehabilitation potential is considered to be Good  Factors which may influence rehabilitation potential include:   [x]             None noted  []             Mental ability/status  []             Medical condition  []             Home/family situation and support systems  []             Safety awareness  []             Pain tolerance/management  []             Other:        PLAN :  Recommendations and Planned Interventions:   [x]               Self Care Training                  [x]      Therapeutic Activities  [x]               Functional Mobility Training   []      Cognitive Retraining  []               Therapeutic Exercises           []      Endurance Activities  []               Balance Training                    []      Neuromuscular Re-Education  []               Visual/Perceptual Training     [x]      Home Safety Training  [x]               Patient Education                   [x]      Family Training/Education  []               Other (comment):    Frequency/Duration: Patient will be followed by Occupational Therapy 1-2 times per day/4-7 days per week to address goals. Discharge Recommendations: Home health with adult supervision at least 24 hours after d/c  Further Equipment Recommendations for Discharge: N/A     SUBJECTIVE:   Patient stated \"I'm feeling pretty good.     OBJECTIVE DATA SUMMARY:     Past Medical History:   Diagnosis Date    Arthritis     Diabetes ([facility] [address]     Hypertension     Pain management     Primary localized osteoarthritis of right hip 9/16/2021 Rheumatoid arthritis ([facility] [address]     Sleep apnea     uses c-pap     Past Surgical History:   Procedure Laterality Date    HX CHOLECYSTECTOMY      HX COLONOSCOPY      HX KNEE REPLACEMENT Left     NEUROLOGICAL PROCEDURE UNLISTED  2003    back fusion    VASCULAR SURGERY PROCEDURE UNLIST      stents in both lower extremeties     Barriers to Learning/Limitations: yes;  physical and altered mental status (i.e.Sedation, Confusion)  Compensate with: visual, verbal, tactile, kinesthetic cues/model    Home Situation/Prior Level of Function:   Home Situation  Home Environment: Private residence  # Steps to Enter: 1  One/Two Story Residence: Two story, live on 1st floor  Lift Chair Available: No  Living Alone: Yes  Support Systems: Child(jennifer), Other Family Member(s)  Patient Expects to be Discharged to[de-identified] House  Current DME Used/Available at Home: [address] [facility], [place], straight, Adaptive dressing aides, Concepcion Clamp, rollator, Walker, rolling, Shower chair  Tub or Shower Type: Shower  []  Right hand dominant   []  Left hand dominant    Cognitive/Behavioral Status:  Neurologic State: Alert  Orientation Level: Oriented to person;Oriented to place;Oriented to situation  Cognition: Follows commands  Safety/Judgement: Awareness of environment    Skin: R hip incision w/ Mepilex   Edema: compression hose in place & applied ice     Coordination: BUE  Coordination: Within functional limits  Fine Motor Skills-Upper: Left Intact; Right Intact    Gross Motor Skills-Upper: Left Intact; Right Intact    Balance:  Sitting: Intact  Standing: Intact; With support    Strength: BUE  Strength: Generally decreased, functional    Tone & Sensation:BUE  Tone: Normal  Sensation: Impaired (R hip)    Range of Motion: BUE  AROM: Generally decreased, functional    Functional Mobility and Transfers for ADLs:  Bed Mobility:  Scooting: Stand-by assistance (vc)    Transfers:  Sit to Stand: Contact guard assistance (vc)   Toilet Transfer : Contact guard assistance (vc)   Bathroom Mobility: Contact guard assistance (vc)    ADL Assessment:  Feeding: Independent  Oral Facial Hygiene/Grooming: Contact guard assistance  Bathing: Minimum assistance  Upper Body Dressing: Supervision  Lower Body Dressing: Contact guard assistance; Adaptive equipment  Toileting: Stand by assistance    ADL Intervention:  Upper Body Dressing Assistance  Dressing Assistance: Supervision  Pullover Shirt: Supervision    Lower Body Dressing Assistance  Dressing Assistance: Contact guard assistance  Underpants: Contact guard assistance  Pants With Button/Zipper: Contact guard assistance  Leg Crossed Method Used: No  Position Performed: Seated in chair  Cues: Verbal cues provided;Visual cues provided    Toileting  Toileting Assistance: Contact guard assistance  Bladder Hygiene: Stand-by assistance  Clothing Management: Contact guard assistance    Cognitive Retraining  Safety/Judgement: Awareness of environment    Pain:  Pain level pre-treatment: 0/10  Pain level post-treatment: 0/10  Pain Intervention(s): Medication administer by Nursing (see MAR); Rest, Ice, Repositioning   Response to intervention: Nurse notified, see doc flow     Activity Tolerance:   Fair. Patient able to stand ~5 minute(s). Patient able to complete ADLs with intermittent rest breaks. Patient limited by strength, ROM, pain. Patient unsteady. Please refer to the flowsheet for vital signs taken during this treatment. After treatment:   [x]  Patient left in no apparent distress sitting up in chair  []  Patient sitting on EOB  []  Patient left in no apparent distress in bed  [x]  Call bell left within reach  [x]  Nursing notified  []  Caregiver present  [x]  Ice applied  []  SCD's on while back in bed  [] Bed alarm activated    COMMUNICATION/EDUCATION:   Communication/Collaboration:  [x]       Role of Occupational Therapy in the acute care setting. [x]      Home safety education was provided and the patient/caregiver indicated understanding.   [x] Patient/family have participated as able in goal setting and plan of care. [x]      Patient/family agree to work toward stated goals and plan of care. []      Patient understands intent and goals of therapy, but is neutral about his/her participation. []      Patient is unable to participate in plan of care at this time. Thank you for this referral.  Emeli Francisco, OTR/L  Time Calculation: 29 mins    Eval Complexity: History: MEDIUM Complexity : Expanded review of history including physical, cognitive and psychosocial  history ; Examination: LOW Complexity : 1-3 performance deficits relating to physical, cognitive , or psychosocial skils that result in activity limitations and / or participation restrictions ;    Decision Making:LOW Complexity : No comorbidities that affect functional and no verbal or physical assistance needed to complete eval tasks

## 2021-09-20 NOTE — PERIOP NOTES
Patient transferred to unit 228 with CPAP and njurse at bedside. VS stable.      Primary Nurse Darryl Boyer and Jacklyn Yao, RN performed a dual skin assessment on this patient No impairment noted      Visit Vitals  BP (!) 149/61 (BP 1 Location: Right arm, BP Patient Position: At rest)   Pulse 84   Temp 97.1 °F (36.2 °C)   Resp 14   Ht 4' 11\" (1.499 m)   Wt 60.7 kg (133 lb 12.8 oz)   SpO2 100%   BMI 27.02 kg/m²

## 2021-09-21 ENCOUNTER — HOME CARE VISIT (OUTPATIENT)
Dept: SCHEDULING | Facility: HOME HEALTH | Age: 81
End: 2021-09-21
Payer: MEDICARE

## 2021-09-21 PROCEDURE — 400013 HH SOC

## 2021-09-21 PROCEDURE — 3331090002 HH PPS REVENUE DEBIT

## 2021-09-21 PROCEDURE — G0151 HHCP-SERV OF PT,EA 15 MIN: HCPCS

## 2021-09-21 PROCEDURE — 3331090001 HH PPS REVENUE CREDIT

## 2021-09-21 PROCEDURE — 400018 HH-NO PAY CLAIM PROCEDURE

## 2021-09-21 PROCEDURE — A6212 FOAM DRG <=16 SQ IN W/BORDER: HCPCS

## 2021-09-22 ENCOUNTER — HOME CARE VISIT (OUTPATIENT)
Dept: SCHEDULING | Facility: HOME HEALTH | Age: 81
End: 2021-09-22
Payer: MEDICARE

## 2021-09-22 ENCOUNTER — HOME CARE VISIT (OUTPATIENT)
Dept: HOME HEALTH SERVICES | Facility: HOME HEALTH | Age: 81
End: 2021-09-22
Payer: MEDICARE

## 2021-09-22 VITALS
HEART RATE: 83 BPM | RESPIRATION RATE: 17 BRPM | TEMPERATURE: 98.5 F | OXYGEN SATURATION: 97 % | SYSTOLIC BLOOD PRESSURE: 135 MMHG | DIASTOLIC BLOOD PRESSURE: 65 MMHG

## 2021-09-22 PROCEDURE — 3331090002 HH PPS REVENUE DEBIT

## 2021-09-22 PROCEDURE — 3331090001 HH PPS REVENUE CREDIT

## 2021-09-22 PROCEDURE — G0157 HHC PT ASSISTANT EA 15: HCPCS

## 2021-09-22 NOTE — HOME HEALTH
Skilled Reason for admission/summary of clinical condition: R THR by Dr Soumya Queen medically necessary to address dx and clinical findings :decreased LE strength, impaired gait, no HEP, stairs, LE swelling, bed mobility, dep in transfers, decreased endurance, decreased balance and decreased safety in order to improve functional mobility, quality of life and decrease burden of care. Caregiver involvement: lives alone but daughter and son will assist with meds, meals, functional mobility, transport to MD appts for next couple of weeks . Medications reconciled and medications are available in the home this visit  The following education was provided regarding high risk medications: Metformin: risk and s/s of hypoglycemia. Educated patient on risk of opioid induced constipation, Medications are effective at this time. Clarification received from MD office regarding Metformin and Tiazac dose: to be 240mg Home health supplies  ordered:  Strength:at initial assessment: R hip flexion NT R knee flexion 3+/5, R knee extension 3+/5, L hip flexion 4-/5, L knee flexion 4-/5, L knee extension 4-/5, Transfers: pt transferred sit to stand with Jose Antonio to cga with vc for scooting to edge of seat and vc for BUE placement to assist with ant weight shift . Performed sit <> supine with cga to min A for assisting RLE on/off the bed.   Educated pt use cane as leg  to reduce pain and reduce burden of care and inprove independence  Gait training: PT instructed pt in amb 55ft with RW X2 with rest break, amb with antalgic gait, decreased L heel strike decreased L knee flexion during swing phase, step to pattern, vc for increasing L stance for step through pattern Patient education provided this visit: breathing HEP, LE strengthening HEP, Total Hip Precaution and transfer/gait training, fall prevention Patient/caregiver degree of understanding:good Home exercise program/Homework provided: 10 reps 3x/daily breathing HEP, seated: LAQ supine: heel slide, SAQ GS QS AP initiated supine and seated therex BLE to be performed 2-3 per day pt and caregiver verbalize understanding Pt/Caregiver instructed on plan of care and are agreeable to plan of care at this time. Plan of care and admission to home health status called to attending physician: Erna Rucker MD  Discharge planning discussed with patient and caregiver. Discharge planning as follows: [facility] when goals met or max benefits achieved. Jamari Schuster Pt/Caregiver did verbalize understanding of discharge planning. Next MD appointment 6 Oct with Ortho Patient/caregiver encouraged/instructed to keep appointment as lack of follow through with physician appointment could result in discontinuation of home care services for non-compliance.

## 2021-09-23 ENCOUNTER — HOME CARE VISIT (OUTPATIENT)
Dept: HOME HEALTH SERVICES | Facility: HOME HEALTH | Age: 81
End: 2021-09-23
Payer: MEDICARE

## 2021-09-23 PROCEDURE — 3331090002 HH PPS REVENUE DEBIT

## 2021-09-23 PROCEDURE — 3331090001 HH PPS REVENUE CREDIT

## 2021-09-24 ENCOUNTER — HOME CARE VISIT (OUTPATIENT)
Dept: SCHEDULING | Facility: HOME HEALTH | Age: 81
End: 2021-09-24
Payer: MEDICARE

## 2021-09-24 PROCEDURE — 3331090001 HH PPS REVENUE CREDIT

## 2021-09-24 PROCEDURE — 3331090002 HH PPS REVENUE DEBIT

## 2021-09-24 PROCEDURE — G0157 HHC PT ASSISTANT EA 15: HCPCS

## 2021-09-25 PROCEDURE — 3331090002 HH PPS REVENUE DEBIT

## 2021-09-25 PROCEDURE — 3331090001 HH PPS REVENUE CREDIT

## 2021-09-26 PROCEDURE — 3331090002 HH PPS REVENUE DEBIT

## 2021-09-26 PROCEDURE — 3331090001 HH PPS REVENUE CREDIT

## 2021-09-27 ENCOUNTER — HOME CARE VISIT (OUTPATIENT)
Dept: SCHEDULING | Facility: HOME HEALTH | Age: 81
End: 2021-09-27
Payer: MEDICARE

## 2021-09-27 PROCEDURE — 3331090002 HH PPS REVENUE DEBIT

## 2021-09-27 PROCEDURE — G0157 HHC PT ASSISTANT EA 15: HCPCS

## 2021-09-27 PROCEDURE — 3331090001 HH PPS REVENUE CREDIT

## 2021-09-28 PROCEDURE — 3331090002 HH PPS REVENUE DEBIT

## 2021-09-28 PROCEDURE — 3331090001 HH PPS REVENUE CREDIT

## 2021-09-29 ENCOUNTER — HOME CARE VISIT (OUTPATIENT)
Dept: SCHEDULING | Facility: HOME HEALTH | Age: 81
End: 2021-09-29
Payer: MEDICARE

## 2021-09-29 PROCEDURE — 3331090001 HH PPS REVENUE CREDIT

## 2021-09-29 PROCEDURE — 3331090002 HH PPS REVENUE DEBIT

## 2021-09-29 PROCEDURE — G0157 HHC PT ASSISTANT EA 15: HCPCS

## 2021-09-30 PROCEDURE — 3331090001 HH PPS REVENUE CREDIT

## 2021-09-30 PROCEDURE — 3331090002 HH PPS REVENUE DEBIT

## 2021-10-01 ENCOUNTER — HOME CARE VISIT (OUTPATIENT)
Dept: SCHEDULING | Facility: HOME HEALTH | Age: 81
End: 2021-10-01
Payer: MEDICARE

## 2021-10-01 PROCEDURE — G0157 HHC PT ASSISTANT EA 15: HCPCS

## 2021-10-01 PROCEDURE — 3331090001 HH PPS REVENUE CREDIT

## 2021-10-01 PROCEDURE — 3331090002 HH PPS REVENUE DEBIT

## 2021-10-02 PROCEDURE — 3331090001 HH PPS REVENUE CREDIT

## 2021-10-02 PROCEDURE — 3331090002 HH PPS REVENUE DEBIT

## 2021-10-03 PROCEDURE — 3331090001 HH PPS REVENUE CREDIT

## 2021-10-03 PROCEDURE — 3331090002 HH PPS REVENUE DEBIT

## 2021-10-04 ENCOUNTER — HOME CARE VISIT (OUTPATIENT)
Dept: SCHEDULING | Facility: HOME HEALTH | Age: 81
End: 2021-10-04
Payer: MEDICARE

## 2021-10-04 PROCEDURE — 3331090001 HH PPS REVENUE CREDIT

## 2021-10-04 PROCEDURE — G0157 HHC PT ASSISTANT EA 15: HCPCS

## 2021-10-04 PROCEDURE — 3331090002 HH PPS REVENUE DEBIT

## 2021-10-05 ENCOUNTER — HOME CARE VISIT (OUTPATIENT)
Dept: SCHEDULING | Facility: HOME HEALTH | Age: 81
End: 2021-10-05
Payer: MEDICARE

## 2021-10-05 PROCEDURE — G0157 HHC PT ASSISTANT EA 15: HCPCS

## 2021-10-05 PROCEDURE — 3331090002 HH PPS REVENUE DEBIT

## 2021-10-05 PROCEDURE — 3331090001 HH PPS REVENUE CREDIT

## 2021-10-06 VITALS
DIASTOLIC BLOOD PRESSURE: 60 MMHG | DIASTOLIC BLOOD PRESSURE: 60 MMHG | HEART RATE: 76 BPM | RESPIRATION RATE: 16 BRPM | RESPIRATION RATE: 16 BRPM | RESPIRATION RATE: 16 BRPM | HEART RATE: 62 BPM | SYSTOLIC BLOOD PRESSURE: 150 MMHG | OXYGEN SATURATION: 92 % | TEMPERATURE: 98.5 F | HEART RATE: 75 BPM | HEART RATE: 67 BPM | HEART RATE: 94 BPM | TEMPERATURE: 98.4 F | OXYGEN SATURATION: 93 % | SYSTOLIC BLOOD PRESSURE: 150 MMHG | RESPIRATION RATE: 16 BRPM | DIASTOLIC BLOOD PRESSURE: 70 MMHG | OXYGEN SATURATION: 98 % | SYSTOLIC BLOOD PRESSURE: 150 MMHG | OXYGEN SATURATION: 99 % | TEMPERATURE: 98.7 F | TEMPERATURE: 98.8 F | TEMPERATURE: 98.8 F | SYSTOLIC BLOOD PRESSURE: 138 MMHG | DIASTOLIC BLOOD PRESSURE: 60 MMHG | TEMPERATURE: 97.7 F | RESPIRATION RATE: 16 BRPM | SYSTOLIC BLOOD PRESSURE: 160 MMHG | RESPIRATION RATE: 18 BRPM | OXYGEN SATURATION: 95 % | SYSTOLIC BLOOD PRESSURE: 130 MMHG | TEMPERATURE: 99 F | DIASTOLIC BLOOD PRESSURE: 70 MMHG | HEART RATE: 86 BPM | HEART RATE: 75 BPM | RESPIRATION RATE: 18 BRPM | DIASTOLIC BLOOD PRESSURE: 62 MMHG | DIASTOLIC BLOOD PRESSURE: 64 MMHG | OXYGEN SATURATION: 97 % | OXYGEN SATURATION: 95 % | SYSTOLIC BLOOD PRESSURE: 160 MMHG

## 2021-10-06 PROCEDURE — 3331090001 HH PPS REVENUE CREDIT

## 2021-10-06 PROCEDURE — 3331090002 HH PPS REVENUE DEBIT

## 2021-10-06 NOTE — HOME HEALTH
SUBJECTIVE: Patient reports she is looking forward to returning to her old routine. CAREGIVER INVOLVEMENT/ASSISTANCE NEEDED FOR: Son lives 1 house down from her and helps with transportationand errandsand neighbors and friends help her at tiems bring her meals. She manages her own meds    [address] ORDERED/DELIVERED THIS VISIT INCLUDE: none    OBJECTIVE:  See interventions. ASSESSMENT OF PROGRESS TOWARD GOALS: Safe ambulationgoal met. Patient is Independent with cryotherapy. CONTINUED NEED FOR THE FOLLOWING SKILLS:  Patient will be seen 3 x week for Physical Therapy to continue to work toward goals within POC and HHPT is medically necessary to address  dx and clinical findings: decreased ROM, decreased strength, impaired gait, decreased ability w stair negotiation, increased swelling, decreased transfer status, decreased endurance, decreased balance and decreased safety, increased pain in order to improve functional mobility/quality of life, decrease burden of care, reduce risk for re-hospitalization, work towards patient's personal goals of return to PLOF w decrease risk for falls. PLAN FOR NEXT VISIT: Discharge assessment from supervising Physical therapist    THE FOLLOWING DISCHARGE PLANNING WAS DISCUSSED WITH THE PATIENT/CAREGIVER: . This is visit number 8 out of 9 planned visits. Patient is in agreement with discharge with return to MD for follow up.   She will discuss outpatient phyiscal therapy at her follow up visit with MD.    NEXT MD APPT:10/06 for follow up with Dr Pete Bush

## 2021-10-06 NOTE — HOME HEALTH
SUBJECTIVE: Patient reports no to all COVID screening questions. CAREGIVER INVOLVEMENT/ASSISTANCE NEEDED FOR: Assit with all aspects of care-has friend with her to assist and her son lives 1 home down from her. HOME HEALTH SUPPLIES BY TYPE AND QUANTITY ORDERED/DELIVERED THIS VISIT INCLUDE: none  OBJECTIVE:  See interventions. ASSESSMENT OF PROGRESS TOWARD GOALS: Patient is able to improve gait fluidity and for each foot to pass the other by return demonstration after instruction. Patient education on diabetic mgmt and possible opioid complications. Sit to stand requires B UE to push up and working to [employer] as [employer]:  Patient will be seen 3 x week for Physical Therapy to continue to work toward goals within POC and HHPT is medically necessary to address  dx and clinical findings: decreased ROM, decreased strength, impaired gait, decreased ability w stair negotiation, increased swelling, decreased bed mobility, decreased transfer status, decreased endurance, decreased balance and decreased safety, increased pain in order to improve functional mobility/quality of life, decrease burden of care, reduce risk for re-hospitalization, work towards patient's personal goals of return to PLOF w decrease risk for falls. PLAN FOR NEXT VISIT: gait/transfer training, bed mobility, strengthening exercises  THE FOLLOWING DISCHARGE PLANNING WAS DISCUSSED WITH THE PATIENT/CAREGIVER: . This is visit number 2 out of 9 planned visits.   NEXT MD APPT:10/06 with Dr Parrish Stacy

## 2021-10-06 NOTE — HOME HEALTH
SUBJECTIVE: Patient reports no to all COVID screening questions. Patient reports decreased edema in R LE and that she has been compliant with elevation, ankle pumps and cryotherapy. CAREGIVER INVOLVEMENT/ASSISTANCE NEEDED FOR: Assit with all aspects of care- her son lives 1 home down from her-she no longer has 24 hour care         [address] ORDERED/DELIVERED THIS VISIT INCLUDE: none      OBJECTIVE:  See interventions. ASSESSMENT OF PROGRESS TOWARD GOALS: Decreased edma in R thigh-assisted patient with donning stockings. Initiated gait training with cane cues for inital sequncing then none were needed-good R foot clearance and occasional cues for upright posturing-30' x 1 on even surfaces close Sup-FWW will continue to be primary AD    CONTINUED NEED FOR THE FOLLOWING SKILLS:  Patient will be seen 3 x week for Physical therapy interventions to continueue to work toward goals within POC and HHPT is medically necessary to address  dx and clinical findings: decreased ROM, decreased strength, impaired gait, decreased ability w stair negotiation, increased swelling, decreased transfer status, decreased endurance, decreased balance and decreased safety, increased pain in order to improve functional mobility/quality of life, decrease burden of care, reduce risk for re-hospitalization, work towards patient's personal goals of return to PLOF w decrease risk for falls. PLAN FOR NEXT VISIT: gait/transfer training, strengthening exercises, bandage change next visit         THE FOLLOWING DISCHARGE PLANNING WAS DISCUSSED WITH THE PATIENT/CAREGIVER: . This is visit number 5 out of 9 planned visits.          ELLE MITCHELL APPT:10/06 with Dr Eve Marcos

## 2021-10-06 NOTE — HOME HEALTH
SUBJECTIVE: Patient reports no to all COVID screening questions. Patient reports increased edema in R LE and reports she called Dr Jacqueline Dockery office over the weeekend. She reports she was told to keep R LE elevated on 3 pillows and to continue to ice. CAREGIVER INVOLVEMENT/ASSISTANCE NEEDED FOR: Assit with all aspects of care- her son lives 1 home down from her-she no longer has 24 hour care    [address] ORDERED/DELIVERED THIS VISIT INCLUDE: none    OBJECTIVE:  See interventions. ASSESSMENT OF PROGRESS TOWARD GOALS: Bandage changed-no signs or sxs of infection. Bed mobilty: no longer requires assist with R LE-independent. Assisted patient with stocking-tight at the top-folded down for comfort and decreased tightness and patient reported 3 pillows causing back pain when lying in bed-patient is comfortable with two pillows-call Dr Jacqueline Dockery office and spoke with Farnaz Abdalla and she approved; Instructed patient to do ankle pumps withelevation. Patient positioned in elevation, utilizing cryotherapy to R hip to elevate for 45 min-1 hour twice daily. Edema in R thigh soft-non-pitting. Complications from opoid meds goal met. CONTINUED NEED FOR THE FOLLOWING SKILLS:  Patient will be seen 3 x week for Physical Therapy to continue to work toward goals within POC and HHPT is medically necessary to address  dx and clinical findings: decreased ROM, decreased strength, impaired gait, decreased ability w stair negotiation, increased swelling, decreased bed mobility, decreased transfer status, decreased endurance, decreased balance and decreased safety, increased pain in order to improve functional mobility/quality of life, decrease burden of care, reduce risk for re-hospitalization, work towards patient's personal goals of return to PLOF w decrease risk for falls.     PLAN FOR NEXT VISIT: gait/transfer training, strengthening exercises    THE FOLLOWING DISCHARGE PLANNING WAS DISCUSSED WITH THE PATIENT/CAREGIVER: . This is visit number 3 out of 9 planned visits.     NEXT MD APPT:10/06 with Dr Bakari Fregoso

## 2021-10-06 NOTE — HOME HEALTH
SUBJECTIVE: Patient reports no to all COVID screening questions. CAREGIVER INVOLVEMENT/ASSISTANCE NEEDED FOR: Assit with all aspects of care-has friend with her to assist and her son lives 1 home down from her. HOME HEALTH SUPPLIES BY TYPE AND QUANTITY ORDERED/DELIVERED THIS VISIT INCLUDE: none    OBJECTIVE:  See interventions. ASSESSMENT OF PROGRESS TOWARD GOALS: Goals met for diabetic and hypertensive lifestyle mgmt. Patient able to improve fluidity of gait with FWW within home nad each foot able to pass the other with less cueing. For bed mobility patient requires assist for R LE onto the bed. Sit tostand requires B UE to push up to stand. CONTINUED NEED FOR THE FOLLOWING SKILLS:  Patient will be seen 3 x week for Physical Therapy to continue to work toward goals within POC and HHPT is medically necessary to address  dx and clinical findings: decreased ROM, decreased strength, impaired gait, decreased ability w stair negotiation, increased swelling, decreased bed mobility, decreased transfer status, decreased endurance, decreased balance and decreased safety, increased pain in order to improve functional mobility/quality of life, decrease burden of care, reduce risk for re-hospitalization, work towards patient's personal goals of return to PLOF w decrease risk for falls. PLAN FOR NEXT VISIT: gait/transfer training, bed mobility, strengthening exercises    THE FOLLOWING DISCHARGE PLANNING WAS DISCUSSED WITH THE PATIENT/CAREGIVER: . This is visit number 3 out of 9 planned visits.     NEXT MD APPT:10/06 with Dr Kvng Chan at 2:20 pm

## 2021-10-06 NOTE — HOME HEALTH
SUBJECTIVE: Patient reports she is feeling good and is able  to get up and \"do more. \"  CAREGIVER INVOLVEMENT/ASSISTANCE NEEDED FOR: Son lives 1 house down from her and helps with transportationand errandsand neighbors and friends help her at tiems bring her meals. She manages her own meds  [address] ORDERED/DELIVERED THIS VISIT INCLUDE: none  OBJECTIVE:  See interventions. ASSESSMENT OF PROGRESS TOWARD GOALS: Tinetti score 22/28 -patient is placed in a Medium fall risk. Bandage changed-old Mepilex removed and new Mepilex applied. Transfer training-patient able to stand without B UE to assist. Anneliese Celeste is able ot walk with good quality for short distance with in home however each foot does not quite pass the other however each foot clears the floor. Patient agrees to utilize cane or walker for communtiy amb. Car transfer Mod I following instruction by return demonstration-transfer goal met. CONTINUED NEED FOR THE FOLLOWING SKILLS:  Patient will be seen 3 x week for Physical Therapy to continue to work toward goals within POC and HHPT is medically necessary to address  dx and clinical findings: decreased ROM, decreased strength, impaired gait, decreased ability w stair negotiation, increased swelling, decreased transfer status, decreased endurance, decreased balance and decreased safety, increased pain in order to improve functional mobility/quality of life, decrease burden of care, reduce risk for re-hospitalization, work towards patient's personal goals of return to PLOF w decrease risk for falls. PLAN FOR NEXT VISIT: Gait/transfer training  THE FOLLOWING DISCHARGE PLANNING WAS DISCUSSED WITH THE PATIENT/CAREGIVER: . This is visit number 7 out of 9 planned visits. Patient is in agreement with discharge with return to MD for follow.   She is discuss outpatient phyiscal therapy   NEXT MD APPT:10/06 for follow up with Dr Javier Marques

## 2021-10-07 PROCEDURE — 3331090001 HH PPS REVENUE CREDIT

## 2021-10-07 PROCEDURE — 3331090002 HH PPS REVENUE DEBIT

## 2021-10-07 NOTE — HOME HEALTH
SUBJECTIVE: Patient reports no to all COVID screening questions. CAREGIVER INVOLVEMENT/ASSISTANCE NEEDED FOR: Assit with all aspects of care- her son lives 1 home down from her-she no longer has 24 hour care    [address]  ORDERED/DELIVERED THIS VISIT INCLUDE: none    OBJECTIVE:  See interventions. Assessment: Patient was able to amb 400' on even and uneven surfaces with cane Sup with no cues needed for sequencing. Instructed patient to return to [facility] if pain/edema increase, if ill, dizziness or with fatigue, and inclement weather-while on pain meds to utilize FWW for night trips to the bathroom-patient agreed. Stair mobility Sup for threshold step and step for outdoors Sup cues for sequencing. No signs or sxs of infection -bandage changed    CONTINUED NEED FOR THE FOLLOWING SKILLS:  Patient will be seen 3 x week for Physical therapy interventions to continueue to work toward goals within POC and HHPT is medically necessary to address  dx and clinical findings: decreased ROM, decreased strength, impaired gait, decreased ability w stair negotiation, increased swelling, decreased transfer status, decreased endurance, decreased balance and decreased safety, increased pain in order to improve functional mobility/quality of life, decrease burden of care, reduce risk for re-hospitalization, work towards patient's personal goals of return to PLOF w decrease risk for falls. PLAN FOR NEXT VISIT: gait/transfer training, strengthening exercises    THE FOLLOWING DISCHARGE PLANNING WAS DISCUSSED WITH THE PATIENT/CAREGIVER: . This is visit number 6 out of 9 planned visits.     ELLE MITCHELL APPT:10/06 with Dr Rachel Connor

## 2021-10-08 ENCOUNTER — HOME CARE VISIT (OUTPATIENT)
Dept: SCHEDULING | Facility: HOME HEALTH | Age: 81
End: 2021-10-08
Payer: MEDICARE

## 2021-10-08 VITALS
HEART RATE: 78 BPM | OXYGEN SATURATION: 94 % | RESPIRATION RATE: 16 BRPM | SYSTOLIC BLOOD PRESSURE: 140 MMHG | DIASTOLIC BLOOD PRESSURE: 65 MMHG | TEMPERATURE: 97.1 F

## 2021-10-08 PROCEDURE — 3331090002 HH PPS REVENUE DEBIT

## 2021-10-08 PROCEDURE — 3331090001 HH PPS REVENUE CREDIT

## 2021-10-08 PROCEDURE — G0151 HHCP-SERV OF PT,EA 15 MIN: HCPCS

## 2021-10-08 NOTE — HOME HEALTH
Patient is being discharged to self/caregiver under MD supervision since all goals have been met and education has been completed, patient is medically stable and patient/caregiver are able to independently manage medications, Meds reconciled/ reviewed,  and disease process and patient no longer requires skilled care. Patient is aware to follow up with PCP/Surgeon as ordered. Opportunity for questions provided at this time. Patient aware to refer any questions after discharge to MD office. patient had f/u appt with ortho on 6 Oct 2021   patient has not scheduled outpatient PT yet, has phone and address of desired PT clinic.

## 2022-03-19 PROBLEM — M16.11 PRIMARY LOCALIZED OSTEOARTHRITIS OF RIGHT HIP: Status: ACTIVE | Noted: 2021-09-16

## 2023-04-26 ENCOUNTER — HOSPITAL ENCOUNTER (OUTPATIENT)
Facility: HOSPITAL | Age: 83
Discharge: HOME | End: 2023-04-29
Payer: MEDICARE

## 2023-04-26 DIAGNOSIS — M16.12 PRIMARY OSTEOARTHRITIS OF LEFT HIP: ICD-10-CM

## 2023-04-26 LAB
ALBUMIN SERPL-MCNC: 3.5 G/DL (ref 3.4–5)
ALBUMIN/GLOB SERPL: 0.9 (ref 0.8–1.7)
ALP SERPL-CCNC: 83 U/L (ref 45–117)
ALT SERPL-CCNC: 17 U/L (ref 13–56)
ANION GAP SERPL CALC-SCNC: 5 MMOL/L (ref 3–18)
APPEARANCE UR: CLEAR
APTT PPP: 28.5 SEC (ref 23–36.4)
AST SERPL-CCNC: 20 U/L (ref 10–38)
BASOPHILS # BLD: 0.1 K/UL (ref 0–0.1)
BASOPHILS NFR BLD: 1 % (ref 0–2)
BILIRUB SERPL-MCNC: 0.3 MG/DL (ref 0.2–1)
BILIRUB UR QL: NEGATIVE
BUN SERPL-MCNC: 19 MG/DL (ref 7–18)
BUN/CREAT SERPL: 30 (ref 12–20)
CALCIUM SERPL-MCNC: 10.3 MG/DL (ref 8.5–10.1)
CHLORIDE SERPL-SCNC: 105 MMOL/L (ref 100–111)
CO2 SERPL-SCNC: 29 MMOL/L (ref 21–32)
COLOR UR: YELLOW
CREAT SERPL-MCNC: 0.64 MG/DL (ref 0.6–1.3)
DIFFERENTIAL METHOD BLD: ABNORMAL
EOSINOPHIL # BLD: 0.1 K/UL (ref 0–0.4)
EOSINOPHIL NFR BLD: 2 % (ref 0–5)
EPITH CASTS URNS QL MICRO: NORMAL /LPF (ref 0–5)
ERYTHROCYTE [DISTWIDTH] IN BLOOD BY AUTOMATED COUNT: 13.8 % (ref 11.6–14.5)
ERYTHROCYTE [SEDIMENTATION RATE] IN BLOOD: 56 MM/HR (ref 0–30)
EST. AVERAGE GLUCOSE BLD GHB EST-MCNC: 140 MG/DL
GLOBULIN SER CALC-MCNC: 3.7 G/DL (ref 2–4)
GLUCOSE SERPL-MCNC: 97 MG/DL (ref 74–99)
GLUCOSE UR STRIP.AUTO-MCNC: NEGATIVE MG/DL
HBA1C MFR BLD: 6.5 % (ref 4.2–5.6)
HCT VFR BLD AUTO: 37 % (ref 35–45)
HGB BLD-MCNC: 12 G/DL (ref 12–16)
HGB UR QL STRIP: NEGATIVE
HYALINE CASTS URNS QL MICRO: NEGATIVE /LPF (ref 0–2)
IMM GRANULOCYTES # BLD AUTO: 0 K/UL (ref 0–0.04)
IMM GRANULOCYTES NFR BLD AUTO: 0 % (ref 0–0.5)
INR PPP: 1 (ref 0.8–1.2)
KETONES UR QL STRIP.AUTO: NEGATIVE MG/DL
LEUKOCYTE ESTERASE UR QL STRIP.AUTO: NEGATIVE
LYMPHOCYTES # BLD: 1.3 K/UL (ref 0.9–3.6)
LYMPHOCYTES NFR BLD: 18 % (ref 21–52)
MCH RBC QN AUTO: 27.5 PG (ref 24–34)
MCHC RBC AUTO-ENTMCNC: 32.4 G/DL (ref 31–37)
MCV RBC AUTO: 84.7 FL (ref 78–100)
MONOCYTES # BLD: 0.5 K/UL (ref 0.05–1.2)
MONOCYTES NFR BLD: 7 % (ref 3–10)
NEUTS SEG # BLD: 5.2 K/UL (ref 1.8–8)
NEUTS SEG NFR BLD: 72 % (ref 40–73)
NITRITE UR QL STRIP.AUTO: NEGATIVE
NRBC # BLD: 0 K/UL (ref 0–0.01)
NRBC BLD-RTO: 0 PER 100 WBC
PH UR STRIP: 6.5 (ref 5–8)
PLATELET # BLD AUTO: 224 K/UL (ref 135–420)
PMV BLD AUTO: 10.3 FL (ref 9.2–11.8)
POTASSIUM SERPL-SCNC: 3.9 MMOL/L (ref 3.5–5.5)
PROT SERPL-MCNC: 7.2 G/DL (ref 6.4–8.2)
PROT UR STRIP-MCNC: 30 MG/DL
PROTHROMBIN TIME: 13.2 SEC (ref 11.5–15.2)
RBC # BLD AUTO: 4.37 M/UL (ref 4.2–5.3)
RBC #/AREA URNS HPF: NEGATIVE /HPF (ref 0–5)
SODIUM SERPL-SCNC: 139 MMOL/L (ref 136–145)
SP GR UR REFRACTOMETRY: 1.01 (ref 1–1.03)
UROBILINOGEN UR QL STRIP.AUTO: 0.2 EU/DL (ref 0.2–1)
WBC # BLD AUTO: 7.3 K/UL (ref 4.6–13.2)
WBC URNS QL MICRO: NEGATIVE /HPF (ref 0–5)
YEAST BUDDING URNS QL: NEGATIVE

## 2023-04-26 PROCEDURE — 85025 COMPLETE CBC W/AUTO DIFF WBC: CPT

## 2023-04-26 PROCEDURE — 83036 HEMOGLOBIN GLYCOSYLATED A1C: CPT

## 2023-04-26 PROCEDURE — 85730 THROMBOPLASTIN TIME PARTIAL: CPT

## 2023-04-26 PROCEDURE — 36415 COLL VENOUS BLD VENIPUNCTURE: CPT

## 2023-04-26 PROCEDURE — 80053 COMPREHEN METABOLIC PANEL: CPT

## 2023-04-26 PROCEDURE — 81001 URINALYSIS AUTO W/SCOPE: CPT

## 2023-04-26 PROCEDURE — 85652 RBC SED RATE AUTOMATED: CPT

## 2023-04-26 PROCEDURE — 85610 PROTHROMBIN TIME: CPT

## 2023-04-27 LAB
BACTERIA SPEC CULT: NORMAL
BACTERIA SPEC CULT: NORMAL
SERVICE CMNT-IMP: NORMAL

## 2023-05-08 PROBLEM — M16.12 OSTEOARTHRITIS OF LEFT HIP: Chronic | Status: ACTIVE | Noted: 2023-05-08

## 2023-05-08 RX ORDER — ACETAMINOPHEN 325 MG/1
650 TABLET ORAL EVERY 6 HOURS
Status: CANCELLED | OUTPATIENT
Start: 2023-05-08

## 2023-05-08 RX ORDER — SODIUM CHLORIDE 9 MG/ML
INJECTION, SOLUTION INTRAVENOUS CONTINUOUS
Status: CANCELLED | OUTPATIENT
Start: 2023-05-08 | End: 2023-05-08

## 2023-05-08 RX ORDER — SODIUM CHLORIDE 9 MG/ML
125 INJECTION, SOLUTION INTRAVENOUS CONTINUOUS
Status: CANCELLED | OUTPATIENT
Start: 2023-05-08

## 2023-05-08 RX ORDER — DIPHENHYDRAMINE HYDROCHLORIDE 50 MG/ML
25 INJECTION INTRAMUSCULAR; INTRAVENOUS EVERY 6 HOURS PRN
Status: CANCELLED | OUTPATIENT
Start: 2023-05-08

## 2023-05-08 RX ORDER — DIPHENHYDRAMINE HCL 25 MG
25 CAPSULE ORAL EVERY 6 HOURS PRN
Status: CANCELLED | OUTPATIENT
Start: 2023-05-08

## 2023-05-08 RX ORDER — SODIUM CHLORIDE 0.9 % (FLUSH) 0.9 %
5-40 SYRINGE (ML) INJECTION PRN
Status: CANCELLED | OUTPATIENT
Start: 2023-05-08

## 2023-05-08 NOTE — H&P
[address] [facility] [address] Medicine  History and Physical Exam    Patient: Ely Jimenez MRN: [id number]  SSN: [id number]    YOB: 1940  Age: 78 y.o. Sex: female      Subjective:      Chief Complaint: Left hip pain    History of Present Illness:  Patient complains of left hip pain and difficulty ambulating, which has progressed over the past several months. X-rays showed osteoarthritis of the joint. The patient's pain has persisted and progressed despite conservative treatments and therapies. The patient has been previously treated with medications and/or injections. The patient has at this time opted for surgical intervention. Past Medical History:   Diagnosis Date    Arthritis     Asthma     COPD (chronic obstructive pulmonary disease) ([facility] [address]     Diabetes ([facility] [address]     History of blood transfusion     Hypertension     On home oxygen therapy     2 LPM continuous    Osteoarthritis of left hip 5/8/2023    Pain management     Poor circulation     vascular stents both legs    Primary localized osteoarthritis of right hip 9/16/2021    Rheumatoid arthritis ([facility] [address]     Rheumatoid arthritis ([facility] [address]     followed by  Dr Beth Hicks    Sleep apnea     uses c-pap     Past Surgical History:   Procedure Laterality Date    CATARACT EXTRACTION W/ INTRAOCULAR LENS IMPLANT Bilateral     CHOLECYSTECTOMY      COLONOSCOPY      NEUROLOGICAL SURGERY  2003    back fusion    TOTAL HIP ARTHROPLASTY Right 2021    TOTAL KNEE ARTHROPLASTY Left     VASCULAR SURGERY      stents in both lower extremeties     Social History     Occupational History    Not on file   Tobacco Use    Smoking status: Never     Passive exposure: Past    Smokeless tobacco: Never   Vaping Use    Vaping Use: Never used   Substance and Sexual Activity    Alcohol use: Not Currently    Drug use: Not Currently    Sexual activity: Not on file     Prior to Admission medications    Medication Sig Start Date End Date Taking?  Authorizing Provider

## 2023-05-09 ENCOUNTER — ANESTHESIA EVENT (OUTPATIENT)
Facility: HOSPITAL | Age: 83
End: 2023-05-09
Payer: MEDICARE

## 2023-05-09 ENCOUNTER — HOSPITAL ENCOUNTER (OUTPATIENT)
Facility: HOSPITAL | Age: 83
Setting detail: OUTPATIENT SURGERY
Discharge: HOME | End: 2023-05-09
Attending: ORTHOPAEDIC SURGERY | Admitting: ORTHOPAEDIC SURGERY
Payer: MEDICARE

## 2023-05-09 ENCOUNTER — APPOINTMENT (OUTPATIENT)
Facility: HOSPITAL | Age: 83
End: 2023-05-09
Attending: ORTHOPAEDIC SURGERY
Payer: MEDICARE

## 2023-05-09 ENCOUNTER — ANESTHESIA (OUTPATIENT)
Facility: HOSPITAL | Age: 83
End: 2023-05-09
Payer: MEDICARE

## 2023-05-09 VITALS
SYSTOLIC BLOOD PRESSURE: 172 MMHG | TEMPERATURE: 97.9 F | DIASTOLIC BLOOD PRESSURE: 63 MMHG | WEIGHT: 131.4 LBS | BODY MASS INDEX: 26.49 KG/M2 | HEIGHT: 59 IN | HEART RATE: 75 BPM | RESPIRATION RATE: 18 BRPM | OXYGEN SATURATION: 95 %

## 2023-05-09 DIAGNOSIS — M16.12 PRIMARY OSTEOARTHRITIS OF LEFT HIP: Primary | Chronic | ICD-10-CM

## 2023-05-09 LAB
ABO + RH BLD: NORMAL
BLOOD GROUP ANTIBODIES SERPL: NORMAL
GLUCOSE BLD STRIP.AUTO-MCNC: 130 MG/DL (ref 70–110)
GLUCOSE BLD STRIP.AUTO-MCNC: 132 MG/DL (ref 70–110)
SPECIMEN EXP DATE BLD: NORMAL

## 2023-05-09 PROCEDURE — 86900 BLOOD TYPING SEROLOGIC ABO: CPT

## 2023-05-09 PROCEDURE — 7100000011 HC PHASE II RECOVERY - ADDTL 15 MIN: Performed by: ORTHOPAEDIC SURGERY

## 2023-05-09 PROCEDURE — 6360000002 HC RX W HCPCS: Performed by: ANESTHESIOLOGY

## 2023-05-09 PROCEDURE — 6360000002 HC RX W HCPCS: Performed by: ORTHOPAEDIC SURGERY

## 2023-05-09 PROCEDURE — 2580000003 HC RX 258: Performed by: ORTHOPAEDIC SURGERY

## 2023-05-09 PROCEDURE — 3700000001 HC ADD 15 MINUTES (ANESTHESIA): Performed by: ORTHOPAEDIC SURGERY

## 2023-05-09 PROCEDURE — 3700000000 HC ANESTHESIA ATTENDED CARE: Performed by: ORTHOPAEDIC SURGERY

## 2023-05-09 PROCEDURE — 2709999900 HC NON-CHARGEABLE SUPPLY: Performed by: ORTHOPAEDIC SURGERY

## 2023-05-09 PROCEDURE — 36415 COLL VENOUS BLD VENIPUNCTURE: CPT

## 2023-05-09 PROCEDURE — 6360000002 HC RX W HCPCS: Performed by: PHYSICIAN ASSISTANT

## 2023-05-09 PROCEDURE — 2580000003 HC RX 258: Performed by: PHYSICIAN ASSISTANT

## 2023-05-09 PROCEDURE — 6370000000 HC RX 637 (ALT 250 FOR IP): Performed by: PHYSICIAN ASSISTANT

## 2023-05-09 PROCEDURE — 7100000000 HC PACU RECOVERY - FIRST 15 MIN: Performed by: ORTHOPAEDIC SURGERY

## 2023-05-09 PROCEDURE — 97116 GAIT TRAINING THERAPY: CPT

## 2023-05-09 PROCEDURE — 2720000010 HC SURG SUPPLY STERILE: Performed by: ORTHOPAEDIC SURGERY

## 2023-05-09 PROCEDURE — C1713 ANCHOR/SCREW BN/BN,TIS/BN: HCPCS | Performed by: ORTHOPAEDIC SURGERY

## 2023-05-09 PROCEDURE — 73501 X-RAY EXAM HIP UNI 1 VIEW: CPT

## 2023-05-09 PROCEDURE — 97161 PT EVAL LOW COMPLEX 20 MIN: CPT

## 2023-05-09 PROCEDURE — 2500000003 HC RX 250 WO HCPCS: Performed by: NURSE ANESTHETIST, CERTIFIED REGISTERED

## 2023-05-09 PROCEDURE — 7100000010 HC PHASE II RECOVERY - FIRST 15 MIN: Performed by: ORTHOPAEDIC SURGERY

## 2023-05-09 PROCEDURE — 86901 BLOOD TYPING SEROLOGIC RH(D): CPT

## 2023-05-09 PROCEDURE — C1776 JOINT DEVICE (IMPLANTABLE): HCPCS | Performed by: ORTHOPAEDIC SURGERY

## 2023-05-09 PROCEDURE — 82962 GLUCOSE BLOOD TEST: CPT

## 2023-05-09 PROCEDURE — 6360000002 HC RX W HCPCS: Performed by: NURSE ANESTHETIST, CERTIFIED REGISTERED

## 2023-05-09 PROCEDURE — 3600000005 HC SURGERY LEVEL 5 BASE: Performed by: ORTHOPAEDIC SURGERY

## 2023-05-09 PROCEDURE — 3600000015 HC SURGERY LEVEL 5 ADDTL 15MIN: Performed by: ORTHOPAEDIC SURGERY

## 2023-05-09 PROCEDURE — 86850 RBC ANTIBODY SCREEN: CPT

## 2023-05-09 PROCEDURE — 7100000001 HC PACU RECOVERY - ADDTL 15 MIN: Performed by: ORTHOPAEDIC SURGERY

## 2023-05-09 PROCEDURE — 2500000003 HC RX 250 WO HCPCS: Performed by: ORTHOPAEDIC SURGERY

## 2023-05-09 RX ORDER — SODIUM CHLORIDE, SODIUM LACTATE, POTASSIUM CHLORIDE, AND CALCIUM CHLORIDE .6; .31; .03; .02 G/100ML; G/100ML; G/100ML; G/100ML
1000 INJECTION, SOLUTION INTRAVENOUS ONCE
Status: COMPLETED | OUTPATIENT
Start: 2023-05-09 | End: 2023-05-09

## 2023-05-09 RX ORDER — DEXAMETHASONE SODIUM PHOSPHATE 4 MG/ML
8 INJECTION, SOLUTION INTRA-ARTICULAR; INTRALESIONAL; INTRAMUSCULAR; INTRAVENOUS; SOFT TISSUE ONCE
Status: COMPLETED | OUTPATIENT
Start: 2023-05-09 | End: 2023-05-09

## 2023-05-09 RX ORDER — ASPIRIN 81 MG/1
81 TABLET ORAL 2 TIMES DAILY
Qty: 42 TABLET | Refills: 0 | Status: SHIPPED | OUTPATIENT
Start: 2023-05-09 | End: 2023-05-30

## 2023-05-09 RX ORDER — LIDOCAINE HYDROCHLORIDE 20 MG/ML
INJECTION, SOLUTION EPIDURAL; INFILTRATION; INTRACAUDAL; PERINEURAL PRN
Status: DISCONTINUED | OUTPATIENT
Start: 2023-05-09 | End: 2023-05-09 | Stop reason: SDUPTHER

## 2023-05-09 RX ORDER — FENTANYL CITRATE 50 UG/ML
25 INJECTION, SOLUTION INTRAMUSCULAR; INTRAVENOUS EVERY 5 MIN PRN
Status: DISCONTINUED | OUTPATIENT
Start: 2023-05-09 | End: 2023-05-09 | Stop reason: HOSPADM

## 2023-05-09 RX ORDER — DIPHENHYDRAMINE HYDROCHLORIDE 50 MG/ML
12.5 INJECTION INTRAMUSCULAR; INTRAVENOUS
Status: DISCONTINUED | OUTPATIENT
Start: 2023-05-09 | End: 2023-05-09 | Stop reason: HOSPADM

## 2023-05-09 RX ORDER — OXYCODONE HYDROCHLORIDE 5 MG/1
5 TABLET ORAL EVERY 4 HOURS PRN
Status: DISCONTINUED | OUTPATIENT
Start: 2023-05-09 | End: 2023-05-09 | Stop reason: HOSPADM

## 2023-05-09 RX ORDER — SODIUM CHLORIDE 0.9 % (FLUSH) 0.9 %
5-40 SYRINGE (ML) INJECTION PRN
Status: DISCONTINUED | OUTPATIENT
Start: 2023-05-09 | End: 2023-05-09 | Stop reason: HOSPADM

## 2023-05-09 RX ORDER — IPRATROPIUM BROMIDE AND ALBUTEROL SULFATE 2.5; .5 MG/3ML; MG/3ML
1 SOLUTION RESPIRATORY (INHALATION)
Status: DISCONTINUED | OUTPATIENT
Start: 2023-05-09 | End: 2023-05-09 | Stop reason: HOSPADM

## 2023-05-09 RX ORDER — PROPOFOL 10 MG/ML
INJECTION, EMULSION INTRAVENOUS PRN
Status: DISCONTINUED | OUTPATIENT
Start: 2023-05-09 | End: 2023-05-09 | Stop reason: SDUPTHER

## 2023-05-09 RX ORDER — ACETAMINOPHEN 500 MG
1000 TABLET ORAL ONCE
Status: COMPLETED | OUTPATIENT
Start: 2023-05-09 | End: 2023-05-09

## 2023-05-09 RX ORDER — TRANEXAMIC ACID 10 MG/ML
INJECTION, SOLUTION INTRAVENOUS PRN
Status: DISCONTINUED | OUTPATIENT
Start: 2023-05-09 | End: 2023-05-09 | Stop reason: SDUPTHER

## 2023-05-09 RX ORDER — ONDANSETRON 2 MG/ML
4 INJECTION INTRAMUSCULAR; INTRAVENOUS EVERY 6 HOURS PRN
Status: DISCONTINUED | OUTPATIENT
Start: 2023-05-09 | End: 2023-05-09 | Stop reason: HOSPADM

## 2023-05-09 RX ORDER — TRANEXAMIC ACID 10 MG/ML
1000 INJECTION, SOLUTION INTRAVENOUS ONCE
Status: DISCONTINUED | OUTPATIENT
Start: 2023-05-09 | End: 2023-05-09 | Stop reason: HOSPADM

## 2023-05-09 RX ORDER — CEFADROXIL 500 MG/1
500 CAPSULE ORAL 2 TIMES DAILY
Qty: 10 CAPSULE | Refills: 0 | Status: SHIPPED | OUTPATIENT
Start: 2023-05-09 | End: 2023-05-14

## 2023-05-09 RX ORDER — SODIUM CHLORIDE, SODIUM LACTATE, POTASSIUM CHLORIDE, CALCIUM CHLORIDE 600; 310; 30; 20 MG/100ML; MG/100ML; MG/100ML; MG/100ML
INJECTION, SOLUTION INTRAVENOUS CONTINUOUS
Status: DISCONTINUED | OUTPATIENT
Start: 2023-05-09 | End: 2023-05-09 | Stop reason: HOSPADM

## 2023-05-09 RX ORDER — OXYCODONE HYDROCHLORIDE 5 MG/1
5 TABLET ORAL
Status: DISCONTINUED | OUTPATIENT
Start: 2023-05-09 | End: 2023-05-09 | Stop reason: HOSPADM

## 2023-05-09 RX ORDER — SODIUM CHLORIDE 9 MG/ML
INJECTION, SOLUTION INTRAVENOUS PRN
Status: DISCONTINUED | OUTPATIENT
Start: 2023-05-09 | End: 2023-05-09 | Stop reason: HOSPADM

## 2023-05-09 RX ORDER — HYDROMORPHONE HYDROCHLORIDE 1 MG/ML
0.5 INJECTION, SOLUTION INTRAMUSCULAR; INTRAVENOUS; SUBCUTANEOUS EVERY 5 MIN PRN
Status: DISCONTINUED | OUTPATIENT
Start: 2023-05-09 | End: 2023-05-09 | Stop reason: HOSPADM

## 2023-05-09 RX ORDER — PANTOPRAZOLE SODIUM 40 MG/1
40 TABLET, DELAYED RELEASE ORAL ONCE
Status: COMPLETED | OUTPATIENT
Start: 2023-05-09 | End: 2023-05-09

## 2023-05-09 RX ORDER — CHLORHEXIDINE GLUCONATE 4 G/100ML
SOLUTION TOPICAL ONCE
Status: DISCONTINUED | OUTPATIENT
Start: 2023-05-09 | End: 2023-05-09 | Stop reason: HOSPADM

## 2023-05-09 RX ORDER — PROCHLORPERAZINE EDISYLATE 5 MG/ML
5 INJECTION INTRAMUSCULAR; INTRAVENOUS
Status: DISCONTINUED | OUTPATIENT
Start: 2023-05-09 | End: 2023-05-09 | Stop reason: HOSPADM

## 2023-05-09 RX ORDER — LABETALOL HYDROCHLORIDE 5 MG/ML
10 INJECTION, SOLUTION INTRAVENOUS
Status: DISCONTINUED | OUTPATIENT
Start: 2023-05-09 | End: 2023-05-09 | Stop reason: HOSPADM

## 2023-05-09 RX ORDER — DEXAMETHASONE SODIUM PHOSPHATE 4 MG/ML
4 INJECTION, SOLUTION INTRA-ARTICULAR; INTRALESIONAL; INTRAMUSCULAR; INTRAVENOUS; SOFT TISSUE ONCE
Status: COMPLETED | OUTPATIENT
Start: 2023-05-09 | End: 2023-05-09

## 2023-05-09 RX ORDER — ONDANSETRON 2 MG/ML
4 INJECTION INTRAMUSCULAR; INTRAVENOUS
Status: DISCONTINUED | OUTPATIENT
Start: 2023-05-09 | End: 2023-05-09 | Stop reason: HOSPADM

## 2023-05-09 RX ORDER — KETOROLAC TROMETHAMINE 15 MG/ML
15 INJECTION, SOLUTION INTRAMUSCULAR; INTRAVENOUS EVERY 6 HOURS
Status: DISCONTINUED | OUTPATIENT
Start: 2023-05-09 | End: 2023-05-09 | Stop reason: HOSPADM

## 2023-05-09 RX ORDER — OXYCODONE HYDROCHLORIDE 5 MG/1
5 TABLET ORAL EVERY 4 HOURS PRN
Qty: 42 TABLET | Refills: 0 | Status: SHIPPED | OUTPATIENT
Start: 2023-05-09 | End: 2023-05-16

## 2023-05-09 RX ORDER — SODIUM CHLORIDE 0.9 % (FLUSH) 0.9 %
5-40 SYRINGE (ML) INJECTION EVERY 12 HOURS SCHEDULED
Status: DISCONTINUED | OUTPATIENT
Start: 2023-05-09 | End: 2023-05-09 | Stop reason: HOSPADM

## 2023-05-09 RX ORDER — OXYCODONE HYDROCHLORIDE 5 MG/1
10 TABLET ORAL EVERY 4 HOURS PRN
Status: DISCONTINUED | OUTPATIENT
Start: 2023-05-09 | End: 2023-05-09 | Stop reason: HOSPADM

## 2023-05-09 RX ADMIN — TRANEXAMIC ACID 1 G: 10 INJECTION, SOLUTION INTRAVENOUS at 08:19

## 2023-05-09 RX ADMIN — ACETAMINOPHEN 1000 MG: 500 TABLET ORAL at 07:04

## 2023-05-09 RX ADMIN — PANTOPRAZOLE SODIUM 40 MG: 40 TABLET, DELAYED RELEASE ORAL at 07:04

## 2023-05-09 RX ADMIN — DEXAMETHASONE SODIUM PHOSPHATE 8 MG: 4 INJECTION, SOLUTION INTRAMUSCULAR; INTRAVENOUS at 09:54

## 2023-05-09 RX ADMIN — SODIUM CHLORIDE, SODIUM LACTATE, POTASSIUM CHLORIDE, AND CALCIUM CHLORIDE: 600; 310; 30; 20 INJECTION, SOLUTION INTRAVENOUS at 07:19

## 2023-05-09 RX ADMIN — PROPOFOL 35 MCG/KG/MIN: 10 INJECTION, EMULSION INTRAVENOUS at 08:23

## 2023-05-09 RX ADMIN — MEPIVACAINE HYDROCHLORIDE 37.5 MG: 15 INJECTION, SOLUTION EPIDURAL; INFILTRATION at 08:09

## 2023-05-09 RX ADMIN — TRANEXAMIC ACID 1 G: 10 INJECTION, SOLUTION INTRAVENOUS at 09:06

## 2023-05-09 RX ADMIN — DEXAMETHASONE SODIUM PHOSPHATE 4 MG: 4 INJECTION, SOLUTION INTRAMUSCULAR; INTRAVENOUS at 07:19

## 2023-05-09 RX ADMIN — SODIUM CHLORIDE, SODIUM LACTATE, POTASSIUM CHLORIDE, AND CALCIUM CHLORIDE: 600; 310; 30; 20 INJECTION, SOLUTION INTRAVENOUS at 09:16

## 2023-05-09 RX ADMIN — FENTANYL CITRATE 25 MCG: 50 INJECTION, SOLUTION INTRAMUSCULAR; INTRAVENOUS at 10:00

## 2023-05-09 RX ADMIN — SODIUM CHLORIDE, POTASSIUM CHLORIDE, SODIUM LACTATE AND CALCIUM CHLORIDE 1000 ML: 600; 310; 30; 20 INJECTION, SOLUTION INTRAVENOUS at 07:19

## 2023-05-09 RX ADMIN — Medication 2000 MG: at 08:30

## 2023-05-09 RX ADMIN — PROPOFOL 20 MG: 10 INJECTION, EMULSION INTRAVENOUS at 08:21

## 2023-05-09 RX ADMIN — LIDOCAINE HYDROCHLORIDE 40 MG: 20 INJECTION, SOLUTION EPIDURAL; INFILTRATION; INTRACAUDAL; PERINEURAL at 08:21

## 2023-05-09 ASSESSMENT — PAIN SCALES - GENERAL: PAINLEVEL_OUTOF10: 6

## 2023-05-09 ASSESSMENT — COPD QUESTIONNAIRES: CAT_SEVERITY: SEVERE

## 2023-05-09 ASSESSMENT — PAIN DESCRIPTION - LOCATION: LOCATION: HIP

## 2023-05-09 ASSESSMENT — PAIN - FUNCTIONAL ASSESSMENT: PAIN_FUNCTIONAL_ASSESSMENT: 0-10

## 2023-05-09 ASSESSMENT — PAIN DESCRIPTION - ORIENTATION: ORIENTATION: LEFT

## 2023-05-09 ASSESSMENT — PAIN DESCRIPTION - DESCRIPTORS: DESCRIPTORS: ACHING

## 2023-05-09 NOTE — OP NOTE
[address] [facility] [address] Medicine  Total Hip Arthroplasty      Patient: Andrea Edouard MRN: [id number]  SSN: [id number]    YOB: 1940  Age: 78 y.o. Sex: female      Date of Surgery: 5/9/2023   Preoperative Diagnosis: LEFT HIP OSTEOARTHRITIS   Postoperative Diagnosis: Same   Location: [facility]  Surgeon: Clem Gonzalez MD  Assistant: Taqueria Pickens PA-C    Anesthesia: Spinal    Procedure: Total Left Hip Arthroplasty    Findings: Degenerative joint disease of the left hip. Estimated Blood Loss: 200ml    Specimens: None    Complications: none    Implants:   Implant Name Type Inv. Item Serial No.  Lot No. LRB No. Used Action   LINER 36MM    [employer]PMM [id number] Left 1 Implanted   POROUS ACETABULAR SHELL    [employer] [id number] Left 1 Implanted   HIP STEM    [employer] [id number] Left 1 Implanted   FEMORAL HEAD    [employer] [id number] Left 1 Implanted       Procedure Detail:  After the patient was brought to the operating suite, She was effectively anesthetized using general anesthesia, then transferred to the Napoleon table and secured in a standard fashion. Her left hip was then prepped and draped in a normal sterile orthopedic fashion. She was given appropriate intravenous antibiotics preoperatively. After a proper timeout was performed, a direct anterior approach to the hip was performed using a short Fong-Bonilla interval. Anterior capsulotomy was performed. The degenerative changes of the hip were noted. Femoral neck osteotomy was then performed to the templated area. The head and neck were removed. The pulvinar and labrum were excised. The acetabulum was then reamed up to 50 mm with good bleeding cancellous bone obtained. The cup was then irrigated with pulse lavage system.  A 50 mm [employer] Legend cup was then impacted in place with excellent stable fixation obtained, placing the cup at

## 2023-05-09 NOTE — ANESTHESIA PROCEDURE NOTES
Spinal Block    Patient location during procedure: OR  End time: 5/9/2023 8:12 AM  Reason for block: primary anesthetic  Staffing  Performed: resident/CRNA   Anesthesiologist: Christiano Martinez DO  Resident/CRNA: Clarnce Boas, APRN - CRNA  Spinal Block  Patient position: sitting  Prep: Betadine  Patient monitoring: continuous pulse ox, frequent blood pressure checks and oxygen  Approach: midline  Location: L3/L4  Provider prep: mask and sterile gloves  Local infiltration: lidocaine  Needle  Needle type: Pencan   Needle gauge: 24 G  Needle length: 3.5 in  Assessment  Swirl obtained: Yes  CSF: clear  Attempts: 1  Hemodynamics: stable  Preanesthetic Checklist  Completed: patient identified, IV checked, site marked, risks and benefits discussed, surgical/procedural consents, equipment checked, pre-op evaluation, timeout performed, anesthesia consent given, oxygen available, monitors applied/VS acknowledged, fire risk safety assessment completed and verbalized and blood product R/B/A discussed and consented

## 2023-05-09 NOTE — DISCHARGE INSTRUCTIONS
[address] [place] [address] Sports Medicine   Patient Discharge Instructions    Arch Krabbe / [id number] : 1940    Admitted 2023 Discharged: 2023     IF YOU HAVE ANY PROBLEMS ONCE YOU ARE AT HOME CALL THE FOLLOWING NUMBERS:   Main office number: [phone]    Your follow up appointment to see either Dr. Dorenda Lesch PA-C, or [facility] as scheduled in 2 weeks. If you are unsure of your appointment date call the office at [phone]. Medication Instructions     Resume your home medictions as directed, you may have directed not to resume supplements until after your follow up. A prescription for pain medication has been given   It is important that you take the medication exactly as they are prescribed. Keep your medication in the bottles provided by the pharmacist and keep a list of the medication names, dosages, and times to be taken in your wallet. Do not take other medications without consulting your doctor. What to do at [address] [place] [address] Ave your prehospital diet. If you have excessive nausea or vomitting call your doctor's office. Be sure to maintain adequate fluid intake. Some pain medications may cause constipation. Remember to drink fluids, stay as active as possible, and eat plenty of fiber-rich foods. Begin In-Home Physical Therapy; 3 times a week to work on gait training, range of motion, strengthening, and weight bearing exercises as tolerable. Continue to use your walker or cane when walking. May progress from the walker to a cane to complete total bearing as tolerable. Patient may shower. Wrap incision with plastic wrap/covering to prevent incision from getting wet. Avoid complete immersion. YOUR DRESSING SHOULD BE CHANGED BY YOUR HOME PHYSICAL THERAPIST 5-7 AFTER SURGERY ACCORDING TO THE DATE WRITTEN ON YOUR DRESSING.           When to Call    - Call if you have a temperature greater then 101  - Unable to keep food down  -

## 2023-05-09 NOTE — PERIOP NOTE
Discharge instructions given to patient and caregiver. Written instructions given, along with book, to take home. Verbal understanding given by patient and caregiver. ID band removed before leaving.

## 2023-05-09 NOTE — PERIOP NOTE
TRANSFER - OUT REPORT:    Verbal report given to DANA Montes RN on Annemarie Del Rosario  being transferred to Phase 2 for routine progression of patient care       Report consisted of patient's Situation, Background, Assessment and   Recommendations(SBAR). Information from the following report(s) Nurse Handoff Report, Adult Overview, Surgery Report, Intake/Output, and MAR was reviewed with the receiving nurse. [place] Assessment: No data recorded  Lines:   Peripheral IV 05/09/23 Right;Posterior Hand (Active)   Site Assessment Clean, dry & intact 05/09/23 1007   Line Status Infusing 05/09/23 [address] Connections checked and tightened 05/09/23 0719   Phlebitis Assessment No symptoms 05/09/23 1007   Infiltration Assessment 0 05/09/23 1007   Alcohol Cap Used No 05/09/23 0719   Dressing Status Clean, dry & intact 05/09/23 1007   Dressing Type Transparent 05/09/23 1007        Opportunity for questions and clarification was provided.       Patient transported with:  O2 @ 2lpm and Registered Nurse

## 2023-05-09 NOTE — ANESTHESIA POSTPROCEDURE EVALUATION
Department of Anesthesiology  Postprocedure Note    Patient: Huy Gu  MRN: [id number]  YOB: 1940  Date of evaluation: 5/9/2023      Procedure Summary     Date: 05/09/23 Room / Location: [facility] [address] / [facility] OR    Anesthesia Start: 0804 Anesthesia Stop: 3348    Procedure: LEFT TOTAL HIP REPLACEMENT ANTERIOR APPROACH WITH C-ARM (SPEC POP) (Left: Hip) Diagnosis:       Osteoarthritis of left hip, unspecified osteoarthritis type      (LEFT HIP OSTEOARTHRITIS)    Surgeons: Honey Calderon MD Responsible Provider: Angela Naranjo DO    Anesthesia Type: Spinal, MAC ASA Status: 3          Anesthesia Type: Spinal, MAC    Kathy Phase I: Kathy Score: 7    Kathy Phase II:        Anesthesia Post Evaluation    Patient location during evaluation: PACU  Patient participation: complete - patient participated  Level of consciousness: awake and alert  Pain score: 0  Airway patency: patent  Nausea & Vomiting: no nausea and no vomiting  Complications: no  Cardiovascular status: blood pressure returned to baseline  Respiratory status: acceptable  Hydration status: euvolemic  Multimodal analgesia pain management approach

## 2023-05-09 NOTE — PERIOP NOTE
TRANSFER - IN REPORT:    Verbal report received from [facility] (name) on Laura Oakley  being received from PACU (unit) for routine progression of patient care      Report consisted of patients Situation, Background, Assessment and   Recommendations(SBAR). Information from the following report(s) Nurse Handoff Report, Surgery Report, Intake/Output, and MAR was reviewed with the receiving nurse. Opportunity for questions and clarification was provided.       Assessment completed upon patients arrival to unit and care assume EKG/Imaging Studies/Labs

## 2023-05-09 NOTE — INTERVAL H&P NOTE
Update History & Physical    The patient's History and Physical of May 8, 2023 was reviewed with the patient and I examined the patient. There was no change. The surgical site was confirmed by the patient and me. Plan: The risks, benefits, expected outcome, and alternative to the recommended procedure have been discussed with the patient. Patient understands and wants to proceed with the procedure.      Electronically signed by Yuriy Olguin MD on 5/9/2023 at 6:38 AM

## 2023-05-09 NOTE — PROGRESS NOTES
SUBJECTIVE:   Patient stated Kaylin Navarro had the other hip done and that went well.     OBJECTIVE DATA SUMMARY:     Past Medical History:   Diagnosis Date    Arthritis     Asthma     COPD (chronic obstructive pulmonary disease) ([facility] [address] [facility]     Diabetes ([facility] [address]     History of blood transfusion     Hypertension     On home oxygen therapy     2 LPM continuous    Osteoarthritis of left hip 5/8/2023    Pain management     Poor circulation     vascular stents both legs    Primary localized osteoarthritis of right hip 9/16/2021    Rheumatoid arthritis ([facility] [address]     Rheumatoid arthritis ([facility] [address]     followed by  Dr Caty Leigh    Sleep apnea     uses c-pap     Past Surgical History:   Procedure Laterality Date    CATARACT EXTRACTION W/ INTRAOCULAR LENS IMPLANT Bilateral     CHOLECYSTECTOMY      COLONOSCOPY      NEUROLOGICAL SURGERY  2003    back fusion    TOTAL HIP ARTHROPLASTY Right 2021    TOTAL KNEE ARTHROPLASTY Left     VASCULAR SURGERY      stents in both lower extremeties     Barriers to Learning/Limitations: physical and other anesthesia  Compensate with: visual, verbal, tactile, kinesthetic cues/model  Home Situation:  Social/Functional History  Lives With: Alone  Type of Home: House  Home Layout: Two level, Able to Live on Main level with bedroom/bathroom  Home Access: Stairs to enter without rails  Entrance Stairs - Number of Steps: 1  Bathroom Shower/Tub: Walk-in shower  Home Equipment: Sofia Meigs, rolling, Rollator, Oxygen  Critical Behavior:  Orientation Level: Oriented to place;Oriented to situation;Oriented to person  Strength:    Strength: Generally decreased, functional  Tone & Sensation:   Tone: Normal  Sensation: Impaired (L hip)  Range Of Motion:  AROM: Generally decreased, functional  PROM: Generally decreased, functional  Functional Mobility:  Bed Mobility:  Transfers:  Transfer Training  Transfer Training: Yes  Interventions: Safety awareness training; Tactile cues; Verbal cues  Sit to Stand: Contact-guard assistance  Stand to Sit:

## 2023-05-09 NOTE — PERIOP NOTE
TRANSFER - IN REPORT:    Verbal report received from DAVID Paredes on Claudean Armour  being received from OR for routine progression of patient care      Report consisted of patient's Situation, Background, Assessment and   Recommendations(SBAR). Information from the following report(s) Nurse Handoff Report, Surgery Report, Intake/Output, and MAR was reviewed with the receiving nurse. Opportunity for questions and clarification was provided. Assessment completed upon patient's arrival to unit and care assumed.

## 2023-05-09 NOTE — DISCHARGE SUMMARY
[address] [address]     DISCHARGE SUMMARY     PATIENT: Brina Green     MRN: [id number]   ADMIT DATE: 2023   [facility]   DISCHARGE DATE: 2023     ATTENDING: Shilpa Olguin MD   DICTATING: LETI Maza     ADMISSION DIAGNOSIS: Osteoarthritis of left hip, unspecified osteoarthritis type [M16.12]    DISCHARGE DIAGNOSIS: Status post LEFT TOTAL HIP ARTHROPLASTY    HISTORY OF PRESENT ILLNESS: The patient is a 78y.o. year-old female   with ongoing left hip pain secondary to osteoarthritis. The patient's pain has persisted and progressed despite conservative treatments and therapies. The patient has at this time opted for surgical intervention. PAST MEDICAL HISTORY:   Past Medical History:   Diagnosis Date    Arthritis     Asthma     COPD (chronic obstructive pulmonary disease) ([address]     Diabetes ([address]     History of blood transfusion     Hypertension     On home oxygen therapy     2 LPM continuous    Osteoarthritis of left hip 2023    Pain management     Poor circulation     vascular stents both legs    Primary localized osteoarthritis of right hip 2021    Rheumatoid arthritis ([address]     Rheumatoid arthritis ([address]     followed by  Dr Fiorella Santos    Sleep apnea     uses c-pap       PAST SURGICAL HISTORY:   Past Surgical History:   Procedure Laterality Date    CATARACT EXTRACTION W/ INTRAOCULAR LENS IMPLANT Bilateral     CHOLECYSTECTOMY      COLONOSCOPY      NEUROLOGICAL SURGERY      back fusion    TOTAL HIP ARTHROPLASTY Right     TOTAL KNEE ARTHROPLASTY Left     VASCULAR SURGERY      stents in both lower extremeties       ALLERGIES:   Allergies   Allergen Reactions    Iodinated Contrast Media Other (See Comments), Rash and Nausea And Vomiting     Able to use topical betadine w/out any issues that she's aware of.            CURRENT MEDICATIONS:  A list of medications prior to the time of admission include:  Prior to

## 2023-05-09 NOTE — ANESTHESIA PRE PROCEDURE
Department of Anesthesiology  Preprocedure Note       Name:  Claudean Armour   Age:  78 y.o.  :  1940                                          MRN:  [id number]         Date:  2023      Surgeon: Soraya Lawrence):  Cinthia Abreu MD    Procedure: Procedure(s):  LEFT TOTAL HIP REPLACEMENT ANTERIOR APPROACH WITH C-ARM (SPEC POP)    Medications prior to admission:   Prior to Admission medications    Medication Sig Start Date End Date Taking?  Authorizing Provider   Acetaminophen (TYLENOL) 325 MG CAPS 1-2 tablet EVERY 12 HOURS (route: oral)  Patient not taking: Reported on 2023   Historical Provider, MD   leflunomide (ARAVA) 10 MG tablet Take 1 tablet by mouth daily 20   Historical Provider, MD   metFORMIN (GLUCOPHAGE) 500 MG tablet Take 1 tablet by mouth in the morning and at bedtime 21   Historical Provider, MD   Red Yeast Rice Extract 600 MG CAPS Take 600 mg by mouth daily    Historical Provider, MD   isosorbide mononitrate (IMDUR) 60 MG extended release tablet Take 1 tablet by mouth daily 20   Historical Provider, MD   furosemide (LASIX) 20 MG tablet Take 1 tablet by mouth daily 21   Historical Provider, MD   fluticasone-salmeterol (ADVAIR HFA) 115-21 MCG/ACT inhaler Inhale 2 puffs into the lungs    Historical Provider, MD   Cinnamon 500 MG CAPS Take 1 capsule by mouth daily    Historical Provider, MD   aspirin 81 MG EC tablet Take 1 tablet by mouth daily    Historical Provider, MD   amLODIPine-benazepril (LOTREL) 10-40 MG per capsule Take 1 capsule by mouth daily 22   Historical Provider, MD   ALBUTEROL SULFATE IN Inhale into the lungs as needed    Historical Provider, MD   Multiple Vitamins-Minerals (CENTRUM ADULTS PO) Take by mouth daily    Historical Provider, MD   Garlic 0537 MG CAPS 1 capsule  DAILY (route: BY MOUTH) 22   Historical Provider, MD   Ginger, Zingiber officinalis, 500 MG CAPS Take 500 mg by mouth daily    Historical Provider, MD   zinc 50 MG TABS

## 2023-05-09 NOTE — PERIOP NOTE
Pt voided on arrival. She arrived late to the facilty this AM, she stated that she could not find the correct location. NC O2 applied at 2 lpm, pt wears at home continuously.

## 2023-05-12 RX ORDER — DILTIAZEM HYDROCHLORIDE 120 MG/1
CAPSULE, EXTENDED RELEASE ORAL DAILY
COMMUNITY
Start: 2021-06-01

## 2023-05-12 RX ORDER — BENAZEPRIL HYDROCHLORIDE 40 MG/1
40 TABLET, FILM COATED ORAL DAILY
COMMUNITY

## (undated) DEVICE — BLADE SAW 1.27X13X90 MM FOR LG BNE

## (undated) DEVICE — Z DISCONTINUED USE (MFG CAT 7984-37) SOLUTION IV SODIUM CHL 0.9% 100 ML INJ

## (undated) DEVICE — SOLUTION IV NACL 0.9% 100 ML FLX CONTAINER

## (undated) DEVICE — SOLUTION IV 100ML 0.9% SOD CHL DIL INJ

## (undated) DEVICE — SUT VCRL + 2-0 36IN CT1 UD --

## (undated) DEVICE — WEREWOLF FASTSEAL 6.0 HEMOSTASIS WAND: Brand: FASTSEAL 6.0 HEMOSTASIS WAND

## (undated) DEVICE — GRIPPER SURGICAL RETRACTOR DISP

## (undated) DEVICE — SOLUTION IV 1000ML LAC RINGERS PH 6.5 INJ USP VIAFLX PLAS

## (undated) DEVICE — SOL INJ L R 1000ML BG --

## (undated) DEVICE — HANDPIECE SET WITH HIGH FLOW TIP AND SUCTION TUBE: Brand: INTERPULSE

## (undated) DEVICE — BLADE ES L6IN ELASTOMERIC COAT EXT DURABLE BEND UPTO 90DEG

## (undated) DEVICE — SUTURE VCRL + SZ 1 L36IN ABSRB UD L36MM CT-1 1/2 CIR VCP947H

## (undated) DEVICE — SOL IRRIGATION INJ NACL 0.9% 500ML BTL

## (undated) DEVICE — ZIP 16 SURGICAL SKIN CLOSURE DEVICE: Brand: ZIP 16 SURGICAL SKIN CLOSURE DEVICE

## (undated) DEVICE — STRYKER PERFORMANCE SERIES SAGITTAL BLADE: Brand: STRYKER PERFORMANCE SERIES

## (undated) DEVICE — NEEDLE SPNL 20GA L3.5IN YEL HUB S STL REG WALL FIT STYL W/

## (undated) DEVICE — SUTURE VCRL + SZ 2-0 L36IN ABSRB UD L36MM CT-1 1/2 CIR VCP945H

## (undated) DEVICE — PENCIL ES L3M ROCK SWCH S STL HEX LOK BLDE ELECTRD HOLSTER

## (undated) DEVICE — ELECTRODE PT RET AD L9FT HI MOIST COND ADH HYDRGEL CORDED

## (undated) DEVICE — BLADE ELECTRODE: Brand: EDGE

## (undated) DEVICE — SOLUTION IRRIG 500ML 0.9% SOD CHLO USP POUR PLAS BTL

## (undated) DEVICE — PACK PROCEDURE SURG TOT HIP ANTR CARTER CUST

## (undated) DEVICE — GARMENT,MEDLINE,DVT,INT,CALF,MED, GEN2: Brand: MEDLINE

## (undated) DEVICE — ROCKER SWITCH PENCIL HOLSTER: Brand: VALLEYLAB

## (undated) DEVICE — SUT VCRL + 1 36IN CT1 VIO --

## (undated) DEVICE — DRESSING ALG W4XL8IN AG FOAM SUPERABSORBENT SIL ANTIMIC